# Patient Record
Sex: FEMALE | Race: WHITE | NOT HISPANIC OR LATINO | Employment: UNEMPLOYED | URBAN - METROPOLITAN AREA
[De-identification: names, ages, dates, MRNs, and addresses within clinical notes are randomized per-mention and may not be internally consistent; named-entity substitution may affect disease eponyms.]

---

## 2019-01-15 ENCOUNTER — OFFICE VISIT (OUTPATIENT)
Dept: FAMILY MEDICINE | Facility: CLINIC | Age: 64
End: 2019-01-15
Payer: COMMERCIAL

## 2019-01-15 VITALS
SYSTOLIC BLOOD PRESSURE: 130 MMHG | OXYGEN SATURATION: 98 % | HEART RATE: 93 BPM | WEIGHT: 107.8 LBS | DIASTOLIC BLOOD PRESSURE: 66 MMHG | TEMPERATURE: 98.8 F | RESPIRATION RATE: 14 BRPM

## 2019-01-15 DIAGNOSIS — R51.9 SINUS HEADACHE: Primary | ICD-10-CM

## 2019-01-15 PROCEDURE — 99213 OFFICE O/P EST LOW 20 MIN: CPT | Performed by: FAMILY MEDICINE

## 2019-01-15 RX ORDER — FLUTICASONE PROPIONATE 50 MCG
1 SPRAY, SUSPENSION (ML) NASAL DAILY
COMMUNITY
End: 2019-07-18

## 2019-01-15 RX ORDER — AMOXICILLIN AND CLAVULANATE POTASSIUM 875; 125 MG/1; MG/1
1 TABLET, FILM COATED ORAL 2 TIMES DAILY
COMMUNITY
End: 2019-01-15 | Stop reason: ENTERED-IN-ERROR

## 2019-01-15 ASSESSMENT — ENCOUNTER SYMPTOMS
RHINORRHEA: 0
SORE THROAT: 0
FACIAL SWELLING: 0
HEADACHES: 0
ADENOPATHY: 0
CHEST TIGHTNESS: 0
COUGH: 0
SINUS PRESSURE: 1
NAUSEA: 0
SINUS PAIN: 1
VOMITING: 0
PALPITATIONS: 0
FATIGUE: 1
DYSURIA: 0
FEVER: 0
SHORTNESS OF BREATH: 0

## 2019-01-15 NOTE — PATIENT INSTRUCTIONS
Sinus headache  At present, there is no clear-cut evidence of bacterial sinusitis.  Since she has initiated an antibiotic course I recommend she completed, but recommend that she remain on fluticasone daily, add humidification to her bedroom or entire home.  Work on boosting the immune power through nutrition adequate rest, and strongly consider supplemental vitamin D at minimum 2000 IU daily, since she cannot tolerate calcium okay at all for many years.  Consider testing for vitamin D deficiency in the future.  Watch for purulent rhinorrhea or other more convincing symptoms of bacterial infection if current antibiotic does not provide relief prior to her departure in 11 days to [facility].  Call if purulent drainage ensues, or fever.      Consideration for ENT eval vs sinus CT but hold off for now per pt wishes.     Avoid saline rinses when there is possible infection, which can be spread by this practice.

## 2019-01-15 NOTE — ASSESSMENT & PLAN NOTE
At present, there is no clear-cut evidence of bacterial sinusitis.  Since she has initiated an antibiotic course I recommend she completed, but recommend that she remain on fluticasone daily, add humidification to her bedroom or entire home.  Work on boosting the immune power through nutrition adequate rest, and strongly consider supplemental vitamin D at minimum 2000 IU daily, since she cannot tolerate calcium okay at all for many years.  Consider testing for vitamin D deficiency in the future.  Watch for purulent rhinorrhea or other more convincing symptoms of bacterial infection if current antibiotic does not provide relief prior to her departure in 11 days to [place].

## 2019-01-15 NOTE — PROGRESS NOTES
"Subjective   Since October she noted frontal headaches and occasional dizziness,  And has used daily saline sinus rinses to relieve pressurer prn, with mason=ited success ( but possibly spreading infection??>)     Last weekend she worsened, so went to [facility] 3 days ago on Saturday, treated with Amoxicillin  ( has intolerance to Augmentin).    Has not checked temp but thinks she  May have had mild fever.      She is on feverfew organic, notes rare chills.  Feels very weak and tired...\"it slammed me\", and lost some taste,    No myalgia.  Her headaches is slightly better.  Ears and frontal  Sinuses still feel pressured.    She sees a natural med specialist and is trying to avoid her old mistakes of overusing antibiotics.      Long termm history of lactose intolerance, takes vitamins sporadically, never tested for vitamin D deficiency,      Patient ID: Debra Miranda is a 63 y.o. female.  1955      HPI    The following have been reviewed and updated as appropriate in this visit:  Allergies  Meds  Problems       Review of Systems   Constitutional: Positive for fatigue. Negative for fever. Unexpected weight change: NO UNEXPLAINED WEIGHT LOSS.   HENT: Positive for ear pain, sinus pain and sinus pressure. Negative for congestion, facial swelling, hearing loss, mouth sores, nosebleeds, postnasal drip, rhinorrhea, sneezing, sore throat and tinnitus.    Respiratory: Negative for cough, chest tightness and shortness of breath.    Cardiovascular: Negative for chest pain and palpitations.   Gastrointestinal: Negative for nausea and vomiting.   Genitourinary: Negative for dysuria.   Skin: Negative for rash.   Neurological: Negative for headaches.   Hematological: Negative for adenopathy.       Objective     Vitals:    01/15/19 1131   BP: 130/66   BP Location: Left upper arm   Patient Position: Sitting   Pulse: 93   Resp: 14   Temp: 37.1 °C (98.8 °F)   TempSrc: Oral   SpO2: 98%   Weight: 48.9 kg (107 lb 12.8 oz)     There is " no height or weight on file to calculate BMI.    Physical Exam   Constitutional: She appears well-developed and well-nourished. No distress.   HENT:   Otic canals clear, Tympanic membranes well visualized and normal, no inflammation, retraction, perforation, discharge    Maxill;xiomy sinuses transilluminate well,    Eyes: no conjunctival injection, no ocular discharge    Nose is normal, no obstruction    Throat no inflammation or exudate, no tonsillar inflammation or enlargement    Neck supple, No Lymphadenopathy, no tenderness    Lungs are clear, normal ventilatory volumes, no rales, no wheezing, no rhonchi    Heart regular rate and rhythm       Assessment/Plan   Problem List Items Addressed This Visit     Sinus headache - Primary     At present, there is no clear-cut evidence of bacterial sinusitis.  Since she has initiated an antibiotic course I recommend she completed, but recommend that she remain on fluticasone daily, add humidification to her bedroom or entire home.  Work on boosting the immune power through nutrition adequate rest, and strongly consider supplemental vitamin D at minimum 2000 IU daily, since she cannot tolerate calcium okay at all for many years.  Consider testing for vitamin D deficiency in the future.  Watch for purulent rhinorrhea or other more convincing symptoms of bacterial infection if current antibiotic does not provide relief prior to her departure in 11 days to [facility].

## 2019-02-07 ENCOUNTER — OFFICE VISIT (OUTPATIENT)
Dept: FAMILY MEDICINE | Facility: CLINIC | Age: 64
End: 2019-02-07
Payer: COMMERCIAL

## 2019-02-07 VITALS
TEMPERATURE: 98 F | RESPIRATION RATE: 16 BRPM | BODY MASS INDEX: 18.96 KG/M2 | WEIGHT: 107 LBS | SYSTOLIC BLOOD PRESSURE: 122 MMHG | HEIGHT: 63 IN | OXYGEN SATURATION: 97 % | DIASTOLIC BLOOD PRESSURE: 88 MMHG | HEART RATE: 60 BPM

## 2019-02-07 DIAGNOSIS — G89.29 CHRONIC INTRACTABLE HEADACHE, UNSPECIFIED HEADACHE TYPE: ICD-10-CM

## 2019-02-07 DIAGNOSIS — R51.9 CHRONIC INTRACTABLE HEADACHE, UNSPECIFIED HEADACHE TYPE: ICD-10-CM

## 2019-02-07 DIAGNOSIS — R53.82 CHRONIC FATIGUE: Primary | ICD-10-CM

## 2019-02-07 LAB
BASOPHILS # BLD AUTO: 0 X10E3/UL (ref 0–0.2)
BASOPHILS NFR BLD AUTO: 0 %
EOSINOPHIL # BLD AUTO: 0 X10E3/UL (ref 0–0.4)
EOSINOPHIL NFR BLD AUTO: 1 %
ERYTHROCYTE [DISTWIDTH] IN BLOOD BY AUTOMATED COUNT: 13 % (ref 12.3–15.4)
HCT VFR BLD AUTO: 42.9 % (ref 34–46.6)
HGB BLD-MCNC: 14.4 G/DL (ref 11.1–15.9)
IMM GRANULOCYTES # BLD AUTO: 0 X10E3/UL (ref 0–0.1)
IMM GRANULOCYTES NFR BLD AUTO: 0 %
LYMPHOCYTES # BLD AUTO: 0.5 X10E3/UL (ref 0.7–3.1)
LYMPHOCYTES NFR BLD AUTO: 12 %
MCH RBC QN AUTO: 30.1 PG (ref 26.6–33)
MCHC RBC AUTO-ENTMCNC: 33.6 G/DL (ref 31.5–35.7)
MCV RBC AUTO: 90 FL (ref 79–97)
MONOCYTES # BLD AUTO: 0.3 X10E3/UL (ref 0.1–0.9)
MONOCYTES NFR BLD AUTO: 7 %
NEUTROPHILS # BLD AUTO: 3.1 X10E3/UL (ref 1.4–7)
NEUTROPHILS NFR BLD AUTO: 80 %
PLATELET # BLD AUTO: 207 X10E3/UL (ref 150–379)
RBC # BLD AUTO: 4.78 X10E6/UL (ref 3.77–5.28)
WBC # BLD AUTO: 3.9 X10E3/UL (ref 3.4–10.8)

## 2019-02-07 PROCEDURE — 99213 OFFICE O/P EST LOW 20 MIN: CPT | Performed by: FAMILY MEDICINE

## 2019-02-07 RX ORDER — DOXYCYCLINE 100 MG/1
100 TABLET ORAL 2 TIMES DAILY
COMMUNITY
End: 2019-07-18

## 2019-02-07 ASSESSMENT — ENCOUNTER SYMPTOMS
PALPITATIONS: 0
DIZZINESS: 0
MUSCULOSKELETAL NEGATIVE: 1
COUGH: 0
ABDOMINAL PAIN: 0
FEVER: 0
WEAKNESS: 0
FATIGUE: 1
FACIAL ASYMMETRY: 0
SHORTNESS OF BREATH: 0
DIARRHEA: 0
CONSTIPATION: 0
PSYCHIATRIC NEGATIVE: 1
ARTHRALGIAS: 0
DYSURIA: 0
HEADACHES: 1

## 2019-02-07 NOTE — PROGRESS NOTES
Subjective      Patient ID: Debra Miranda is a 63 y.o. female.    Headache (on/off since Tick bite in 10-18)  Fatigue (on/off since Tick bite in 10-18)  Lab slip (test for Lymes)    She is here today due to concern about the possibility of having Lyme disease.  She states that she had a tick bite late last October and shortly after had experienced a migraine for the first time in her life.  She states that she knows it was a migraine because it was proceeded with some visual disturbances.  Ever since that time she has become increasingly tired and has a headache which is constant.  She saw ENT recently for an ear infection and was started on doxycycline because of the history of a tick bite.  It was prescribed for 10 days.  She is not yet complete with that course of therapy.        The following have been reviewed and updated as appropriate in this visit:       Review of Systems   Constitutional: Positive for fatigue. Negative for fever.   HENT: Negative.    Eyes: Negative for visual disturbance.   Respiratory: Negative for cough and shortness of breath.    Cardiovascular: Negative for chest pain and palpitations.   Gastrointestinal: Negative for abdominal pain, constipation and diarrhea.   Genitourinary: Negative for dysuria.   Musculoskeletal: Negative.  Negative for arthralgias.   Skin: Negative for rash.   Neurological: Positive for headaches. Negative for dizziness, facial asymmetry and weakness.   Psychiatric/Behavioral: Negative.      History reviewed. No pertinent past medical history.  Family History   Problem Relation Age of Onset   • Heart failure Mother    • Melanoma Father      Social History     Social History   • Marital status:      Spouse name: N/A   • Number of children: N/A   • Years of education: N/A     Occupational History   • Not on file.     Social History Main Topics   • Smoking status: Never Smoker   • Smokeless tobacco: Never Used   • Alcohol use Not on file   • Drug use: Unknown   •  "Sexual activity: Not on file     Other Topics Concern   • Not on file     Social History Narrative   • No narrative on file     No Known Allergies        Current Outpatient Prescriptions   Medication Sig Dispense Refill   • doxycycline monohydrate (ADOXA) 100 mg tablet Take 100 mg by mouth 2 (two) times a day.     • fluticasone (FLONASE) 50 mcg/actuation nasal spray Administer 1 spray into each nostril daily.       No current facility-administered medications for this visit.      Objective     /88 (BP Location: Right upper arm, Patient Position: Sitting)   Pulse 60   Temp 36.7 °C (98 °F)   Resp 16   Ht 1.6 m (5' 3\")   Wt 48.5 kg (107 lb)   SpO2 97%   BMI 18.95 kg/m²     Physical Exam   Constitutional: She is oriented to person, place, and time. She appears well-developed and well-nourished.   HENT:   Head: Normocephalic.   Eyes: Conjunctivae are normal.   Neck: Neck supple. No thyromegaly present.   Cardiovascular: Normal rate, regular rhythm and normal heart sounds.    Pulmonary/Chest: Effort normal and breath sounds normal.   Musculoskeletal: She exhibits no edema.   Lymphadenopathy:     She has no cervical adenopathy.   Neurological: She is alert and oriented to person, place, and time.   Skin: Skin is warm and dry.   Psychiatric: She has a normal mood and affect. Her behavior is normal. Judgment and thought content normal.       Assessment/Plan   Problem List Items Addressed This Visit     None      Visit Diagnoses     Chronic fatigue    -  Primary    I explained that I cannot be certain as to the exact cause of her symptoms but that we should at least start with some routine lab work.    Relevant Orders    CBC and differential    TSH 3rd Generation    Lyme EIA reflex WB    Chronic intractable headache, unspecified headache type        See above.    Relevant Orders    Lyme EIA reflex WB        Sebastián Downs, DO  2/7/2019  "

## 2019-02-08 LAB
B BURGDOR IGG+IGM SER-ACNC: <0.91 ISR (ref 0–0.9)
B BURGDOR IGM SER IA-ACNC: <0.8 INDEX (ref 0–0.79)
TSH SERPL DL<=0.005 MIU/L-ACNC: 1.46 UIU/ML (ref 0.45–4.5)

## 2019-02-20 ENCOUNTER — TELEPHONE (OUTPATIENT)
Dept: FAMILY MEDICINE | Facility: CLINIC | Age: 64
End: 2019-02-20

## 2019-02-20 NOTE — TELEPHONE ENCOUNTER
Pt called leaving  stating she was suppose to call you and let you know she went to [place] for her consult and that you advised her to call to discuss this with you. She stated she is available to talk all day today at [phone].

## 2019-02-22 ENCOUNTER — OFFICE VISIT (OUTPATIENT)
Dept: FAMILY MEDICINE | Facility: CLINIC | Age: 64
End: 2019-02-22
Payer: COMMERCIAL

## 2019-02-22 VITALS
DIASTOLIC BLOOD PRESSURE: 84 MMHG | SYSTOLIC BLOOD PRESSURE: 138 MMHG | OXYGEN SATURATION: 98 % | WEIGHT: 107 LBS | TEMPERATURE: 98.4 F | BODY MASS INDEX: 18.95 KG/M2 | RESPIRATION RATE: 16 BRPM | HEART RATE: 69 BPM

## 2019-02-22 DIAGNOSIS — R51.9 HEADACHE DUE TO INTRACRANIAL DISEASE: ICD-10-CM

## 2019-02-22 DIAGNOSIS — G93.9 HEADACHE DUE TO INTRACRANIAL DISEASE: ICD-10-CM

## 2019-02-22 DIAGNOSIS — D32.0 BENIGN MENINGIOMA OF BRAIN (CMS/HCC): Primary | ICD-10-CM

## 2019-02-22 PROCEDURE — 99213 OFFICE O/P EST LOW 20 MIN: CPT | Performed by: FAMILY MEDICINE

## 2019-02-22 RX ORDER — DEXAMETHASONE 2 MG/1
TABLET ORAL
Qty: 30 TABLET | Refills: 0
Start: 2019-02-22 | End: 2019-07-18

## 2019-02-22 ASSESSMENT — ENCOUNTER SYMPTOMS
HEADACHES: 1
FATIGUE: 0
EYE DISCHARGE: 0
FEVER: 0
SEIZURES: 0
UNEXPECTED WEIGHT CHANGE: 0
DIFFICULTY URINATING: 0
ABDOMINAL PAIN: 0
WEAKNESS: 0
NUMBNESS: 0
COUGH: 0
PALPITATIONS: 0
EYE REDNESS: 0
FACIAL ASYMMETRY: 0
TREMORS: 0
DIZZINESS: 0
SPEECH DIFFICULTY: 0
LIGHT-HEADEDNESS: 0
SHORTNESS OF BREATH: 0

## 2019-02-22 NOTE — ASSESSMENT & PLAN NOTE
Titrate dexamethasone to 2 mg 3 times daily.  I discussed her case with her neurosurgeon Dr. Wale French at [facility] and he agrees with plans.  Patient is advised to present to the emergency room if headaches become severe or if new focal neurological symptoms or visual symptoms are experienced.  Reduce sodium intake, increase hydration.  Continue OTC Tylenol as needed headache.

## 2019-02-22 NOTE — PATIENT INSTRUCTIONS
Headache due to intracranial disease  Titrate dexamethasone to 2 mg 3 times daily.  I discussed her case with her neurosurgeon Dr. Wale French at [facility] and he agrees with plans.  Patient is advised to present to the emergency room if headaches become severe or if new focal neurological symptoms or visual symptoms are experienced.  Reduce sodium intake, increase hydration.  Continue OTC Tylenol as needed headache.    Benign meningioma of brain (CMS/HCC) (HCC)      Proceed with surgery as scheduled in 2 weeks.

## 2019-02-22 NOTE — PROGRESS NOTES
Subjective  Patient who is scheduled for elective surgery in 2 weeks by Wale French MD at [facility] for substantial left-sided meningioma that is exhibiting some mass-effect has been on oral dexamethasone since recent hospitalization.  Her dosage was tapered from 6 mg daily to 4 mg daily just 2 days ago and since then she has been experiencing mild to moderate right-sided temporal and yvette-aural pressure.  She wanted to be sure this was not an ear infection.  She denies URI symptoms.She denies focal neurological changes: No diplopia or blurry vision visual field cut weakness numbness tingling loss of balance or coordination.     Patient ID: Debra Miranda is a 63 y.o. female.  1955      HPI    The following have been reviewed and updated as appropriate in this visit:  Allergies  Meds  Problems       Review of Systems   Constitutional: Negative for fatigue, fever and unexpected weight change.   HENT: Positive for ear pain.    Eyes: Negative for discharge, redness and visual disturbance.   Respiratory: Negative for cough and shortness of breath.    Cardiovascular: Negative for chest pain and palpitations.   Gastrointestinal: Negative for abdominal pain.   Genitourinary: Negative for difficulty urinating.   Skin: Negative for rash.   Neurological: Positive for headaches. Negative for dizziness, tremors, seizures, syncope, facial asymmetry, speech difficulty, weakness, light-headedness and numbness.       Objective     Vitals:    02/22/19 1118   BP: 138/84   Pulse: 69   Resp: 16   Temp: 36.9 °C (98.4 °F)   SpO2: 98%   Weight: 48.5 kg (107 lb)     Body mass index is 18.95 kg/m².    Physical Exam   Constitutional: She is oriented to person, place, and time. She appears well-developed and well-nourished. No distress.   HENT:   Castorena test is nonlateralizing.    Otic Canals clear,     Tympanic membranes intact: No right-sided effusion or inflammation whatsoever  Eyes: TIGIST, EOMI, no conjunctival injection  Nose and  throat clear, no inflammation or exudate, no oral mucosal lesions  Neck supple, with intact range of motion, no mass, no lymphadenopathy, no thyromegaly, no thyroid nodule, no carotid bruit, no JVD.   Cardiovascular: Normal rate, regular rhythm and normal heart sounds.  Exam reveals no gallop and no friction rub.    No murmur heard.  Pulmonary/Chest: Effort normal and breath sounds normal. She has no wheezes. She has no rales.   Musculoskeletal: She exhibits no edema.   Back no CVA tenderness    Legs: no edema, no cristian venous insufficiency changes   Neurological: She is alert and oriented to person, place, and time. She displays normal reflexes. No cranial nerve deficit or sensory deficit. She exhibits normal muscle tone. Coordination normal.   Skin: Skin is warm and dry. No rash noted.   Psychiatric: She has a normal mood and affect.       Assessment/Plan   Diagnoses and all orders for this visit:    Benign meningioma of brain (CMS/HCC) (HCC) (Primary)  Assessment & Plan:      Proceed with surgery as scheduled in 2 weeks.      Headache due to intracranial disease  Assessment & Plan:  Titrate dexamethasone to 2 mg 3 times daily.  I discussed her case with her neurosurgeon Dr. Wale French at [facility] and he agrees with plans.  Patient is advised to present to the emergency room if headaches become severe or if new focal neurological symptoms or visual symptoms are experienced.  Reduce sodium intake, increase hydration.  Continue OTC Tylenol as needed headache.      Other orders  -     dexamethasone (DECADRON) 2 mg tablet; One tab 3 times daily

## 2019-03-20 ENCOUNTER — TRANSCRIBE ORDERS (OUTPATIENT)
Dept: SCHEDULING | Age: 64
End: 2019-03-20

## 2019-03-20 DIAGNOSIS — C85.10 B-CELL LYMPHOMA (CMS/HCC): Primary | ICD-10-CM

## 2019-03-25 ENCOUNTER — TRANSCRIBE ORDERS (OUTPATIENT)
Dept: SCHEDULING | Age: 64
End: 2019-03-25

## 2019-03-25 DIAGNOSIS — C85.10 B-CELL LYMPHOMA (CMS/HCC): Primary | ICD-10-CM

## 2019-03-26 ENCOUNTER — HOSPITAL ENCOUNTER (OUTPATIENT)
Dept: RADIOLOGY | Facility: HOSPITAL | Age: 64
Discharge: HOME | End: 2019-03-26
Attending: INTERNAL MEDICINE
Payer: COMMERCIAL

## 2019-03-26 DIAGNOSIS — C85.10 B-CELL LYMPHOMA (CMS/HCC): ICD-10-CM

## 2019-04-02 ENCOUNTER — HOSPITAL ENCOUNTER (OUTPATIENT)
Dept: RADIOLOGY | Facility: HOSPITAL | Age: 64
Discharge: HOME | End: 2019-04-02
Attending: INTERNAL MEDICINE
Payer: COMMERCIAL

## 2019-04-02 DIAGNOSIS — C85.10 B-CELL LYMPHOMA (CMS/HCC): ICD-10-CM

## 2019-04-02 RX ORDER — GADOTERATE MEGLUMINE 376.9 MG/ML
9.5 INJECTION INTRAVENOUS ONCE
Status: COMPLETED | OUTPATIENT
Start: 2019-04-02 | End: 2019-04-02

## 2019-04-02 RX ADMIN — GADOTERATE MEGLUMINE 9.5 ML: 376.9 INJECTION INTRAVENOUS at 10:55

## 2019-04-17 ENCOUNTER — OFFICE VISIT (OUTPATIENT)
Dept: FAMILY MEDICINE | Facility: CLINIC | Age: 64
End: 2019-04-17
Payer: COMMERCIAL

## 2019-04-17 VITALS
HEART RATE: 87 BPM | OXYGEN SATURATION: 96 % | TEMPERATURE: 98.2 F | HEIGHT: 63 IN | DIASTOLIC BLOOD PRESSURE: 78 MMHG | RESPIRATION RATE: 16 BRPM | WEIGHT: 107 LBS | SYSTOLIC BLOOD PRESSURE: 112 MMHG | BODY MASS INDEX: 18.96 KG/M2

## 2019-04-17 DIAGNOSIS — C85.10 LOW GRADE B-CELL LYMPHOMA (CMS/HCC): Primary | ICD-10-CM

## 2019-04-17 PROBLEM — M85.80 OSTEOPENIA: Status: ACTIVE | Noted: 2017-05-26

## 2019-04-17 PROBLEM — G93.89 BRAIN MASS: Status: ACTIVE | Noted: 2019-02-09

## 2019-04-17 PROBLEM — M79.7 FIBROMYALGIA: Status: ACTIVE | Noted: 2019-04-17

## 2019-04-17 PROBLEM — H65.91 RIGHT NON-SUPPURATIVE OTITIS MEDIA: Status: ACTIVE | Noted: 2019-02-13

## 2019-04-17 PROCEDURE — 99213 OFFICE O/P EST LOW 20 MIN: CPT | Performed by: FAMILY MEDICINE

## 2019-04-17 RX ORDER — CHROMIUM PICOLINATE 200 MCG
TABLET ORAL
COMMUNITY

## 2019-04-17 RX ORDER — DEXAMETHASONE 1 MG/1
1 TABLET ORAL
COMMUNITY
End: 2019-07-18

## 2019-04-17 RX ORDER — VITAMIN B COMPLEX
1 CAPSULE ORAL DAILY
COMMUNITY
End: 2019-10-07 | Stop reason: ALTCHOICE

## 2019-04-17 RX ORDER — VITS A,C,E/LUTEIN/MINERALS 300MCG-200
TABLET ORAL
COMMUNITY

## 2019-04-17 ASSESSMENT — PAIN SCALES - GENERAL: PAINLEVEL: 0-NO PAIN

## 2019-04-18 ASSESSMENT — ENCOUNTER SYMPTOMS
CONSTITUTIONAL NEGATIVE: 1
SHORTNESS OF BREATH: 0
PSYCHIATRIC NEGATIVE: 1
PALPITATIONS: 0
ABDOMINAL PAIN: 0
CONSTIPATION: 0
COUGH: 0
DIARRHEA: 0
HEADACHES: 1
DYSURIA: 0
MUSCULOSKELETAL NEGATIVE: 1

## 2019-04-18 NOTE — PROGRESS NOTES
Subjective      Patient ID: Debra Miranda is a 63 y.o. female.    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  She is scheduled to see her oncologist next week and had a bone marrow done earlier this week.  She reports no neurologic deficits following surgery and is regaining her strength and energy fairly rapidly.        The following have been reviewed and updated as appropriate in this visit:       Review of Systems   Constitutional: Negative.    HENT: Negative.    Eyes: Negative for visual disturbance.   Respiratory: Negative for cough and shortness of breath.    Cardiovascular: Negative for chest pain and palpitations.   Gastrointestinal: Negative for abdominal pain, constipation and diarrhea.   Genitourinary: Negative for dysuria.   Musculoskeletal: Negative.    Skin: Negative for rash.   Neurological: Positive for headaches (occasional and decreasing intensity).   Psychiatric/Behavioral: Negative.      Past Medical History:   Diagnosis Date   • Asthma    • Brain tumor (benign) (CMS/HCC) (HCC)    • Fibromyalgia    • Headache    • Lymphoma, small follicular center-cell (CMS/HCC) (HCC)    • Osteopenia      Family History   Problem Relation Age of Onset   • Heart failure Mother    • Melanoma Father      Social History     Social History   • Marital status:      Spouse name: N/A   • Number of children: N/A   • Years of education: N/A     Occupational History   • Not on file.     Social History Main Topics   • Smoking status: Never Smoker   • Smokeless tobacco: Never Used   • Alcohol use No   • Drug use: No   • Sexual activity: Not on file  "    Other Topics Concern   • Not on file     Social History Narrative   • No narrative on file     Allergies   Allergen Reactions   • Sudafed [Pseudoephedrine Hcl]      HIVES           Current Outpatient Prescriptions   Medication Sig Dispense Refill   • acetaminophen (TYLENOL) 325 mg tablet Take by mouth every 6 (six) hours as needed.     • b complex vitamins capsule Take 1 capsule by mouth daily.     • calcium carbonate-vitamin D3 (CALCIUM 600 WITH VITAMIN D3) 600 mg(1,500mg) -400 unit capsule Take by mouth.     • dexamethasone (DECADRON) 1 mg tablet Take 1 mg by mouth daily with breakfast.     • FISH OIL-omega-3 fatty acids (FISH OIL) 340-1,000 mg capsule Take by mouth 2 (two) times a day.     • magnesium oxide 200 mg magnesium tablet Take by mouth.     • dexamethasone (DECADRON) 2 mg tablet One tab 3 times daily (Patient not taking: Reported on 4/17/2019 ) 30 tablet 0   • diazePAM (VALIUM) 5 mg tablet Take 5 mg by mouth every 6 (six) hours as needed.     • doxycycline monohydrate (ADOXA) 100 mg tablet Take 100 mg by mouth 2 (two) times a day.     • fluticasone (FLONASE) 50 mcg/actuation nasal spray Administer 1 spray into each nostril daily.     • levETIRAcetam (KEPPRA) 250 mg tablet Take by mouth.       No current facility-administered medications for this visit.      Objective     /78 (BP Location: Left upper arm, Patient Position: Sitting)   Pulse 87   Temp 36.8 °C (98.2 °F) (Oral)   Resp 16   Ht 1.6 m (5' 3\")   Wt 48.5 kg (107 lb)   SpO2 96%   BMI 18.95 kg/m²     Physical Exam   Constitutional: She is oriented to person, place, and time. She appears well-developed and well-nourished.   HENT:   Head: Normocephalic.   Eyes: Conjunctivae are normal.   Neck: Neck supple. No thyromegaly present.   Cardiovascular: Normal rate, regular rhythm and normal heart sounds.    Pulmonary/Chest: Effort normal and breath sounds normal.   Musculoskeletal: She exhibits no edema.   Lymphadenopathy:     She has no " cervical adenopathy.   Neurological: She is alert and oriented to person, place, and time.   Skin: Skin is warm and dry.   Psychiatric: She has a normal mood and affect. Her behavior is normal. Judgment and thought content normal.       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

## 2019-07-11 ENCOUNTER — TRANSCRIBE ORDERS (OUTPATIENT)
Dept: SCHEDULING | Age: 64
End: 2019-07-11

## 2019-07-11 DIAGNOSIS — C82.90 FOLLICULAR LYMPHOMA (CMS/HCC): Primary | ICD-10-CM

## 2019-07-22 ENCOUNTER — TRANSCRIBE ORDERS (OUTPATIENT)
Dept: SCHEDULING | Age: 64
End: 2019-07-22

## 2019-07-22 DIAGNOSIS — C82.90 FOLLICULAR LYMPHOMA (CMS/HCC): Primary | ICD-10-CM

## 2019-07-29 ENCOUNTER — HOSPITAL ENCOUNTER (OUTPATIENT)
Dept: RADIOLOGY | Facility: HOSPITAL | Age: 64
Discharge: HOME | End: 2019-07-29
Attending: INTERNAL MEDICINE
Payer: COMMERCIAL

## 2019-07-29 DIAGNOSIS — C82.90 FOLLICULAR LYMPHOMA (CMS/HCC): ICD-10-CM

## 2019-07-29 RX ORDER — GADOBUTROL 604.72 MG/ML
5 INJECTION INTRAVENOUS ONCE
Status: COMPLETED | OUTPATIENT
Start: 2019-07-29 | End: 2019-07-29

## 2019-07-29 RX ADMIN — GADOBUTROL 5 ML: 604.72 INJECTION INTRAVENOUS at 09:29

## 2019-08-15 ENCOUNTER — HOSPITAL ENCOUNTER (OUTPATIENT)
Dept: CARDIOLOGY | Age: 64
Discharge: HOME | End: 2019-08-15
Attending: INTERNAL MEDICINE
Payer: COMMERCIAL

## 2019-08-15 VITALS
WEIGHT: 110 LBS | SYSTOLIC BLOOD PRESSURE: 118 MMHG | BODY MASS INDEX: 19.49 KG/M2 | HEIGHT: 63 IN | DIASTOLIC BLOOD PRESSURE: 66 MMHG

## 2019-08-15 DIAGNOSIS — C82.90 FOLLICULAR LYMPHOMA (CMS/HCC): ICD-10-CM

## 2019-08-15 LAB
AORTIC ROOT ANNULUS: 2.3 CM
ASCENDING AORTA: 2.8 CM
BSA FOR ECHO PROCEDURE: 1.49 M2
E WAVE DECELERATION TIME: 222 MS
E/A RATIO: 0.8
E/E' RATIO: 10.6
EDV (BP): 56.3 CM3
EF (A4C): 68.8 %
EF A2C: 82.3 %
EJECTION FRACTION: 76.2 %
EST RIGHT VENT SYSTOLIC PRESSURE BY TRICUSPID REGURGITATION JET: 25 MMHG
ESV (BP): 13.4 CM3
FRACTIONAL SHORTENING: 42.6 %
INTERVENTRICULAR SEPTUM: 0.98 CM
LA ESV (BP): 25.4 CM3
LA ESV INDEX (A2C): 20.67 CM3/M2
LA ESV INDEX (BP): 17.05 CM3/M2
LA/AORTA RATIO: 1.48
LAAS-AP2: 12.7 CM2
LAAS-AP4: 10.1 CM2
LAD 2D: 3.4 CM
LALD A4C: 3.91 CM
LALD A4C: 4.42 CM
LAV-S: 30.8 CM3
LEFT ATRIUM VOLUME INDEX: 12.89 CM3/M2
LEFT ATRIUM VOLUME: 19.2 CM3
LEFT INTERNAL DIMENSION IN SYSTOLE: 2.32 CM (ref 2.24–3.38)
LEFT VENTRICLE DIASTOLIC VOLUME INDEX: 38.32 CM3/M2
LEFT VENTRICLE DIASTOLIC VOLUME: 57.1 CM3
LEFT VENTRICLE SYSTOLIC VOLUME INDEX: 11.95 CM3/M2
LEFT VENTRICLE SYSTOLIC VOLUME: 17.8 CM3
LEFT VENTRICULAR INTERNAL DIMENSION IN DIASTOLE: 4.04 CM (ref 3.76–5.22)
LEFT VENTRICULAR POSTERIOR WALL IN END DIASTOLE: 0.86 CM (ref 0.48–0.89)
LV DIASTOLIC VOLUME: 55.1 CM3
LV ESV (APICAL 2 CHAMBER): 9.76 CM3
LVAD-AP2: 21 CM2
LVAD-AP4: 21.7 CM2
LVAS-AP2: 7.58 CM2
LVAS-AP4: 10.5 CM2
LVEDVI(A2C): 36.98 CM3/M2
LVEDVI(BP): 37.79 CM3/M2
LVESVI(A2C): 6.55 CM3/M2
LVESVI(BP): 8.99 CM3/M2
LVLD-AP2: 6.79 CM
LVLD-AP4: 6.86 CM
LVLS-AP2: 5.14 CM
LVLS-AP4: 5.37 CM
MV E'TISSUE VEL-MED: 0.07 M/S
MV PEAK A VEL: 0.95 M/S
MV PEAK E VEL: 0.78 M/S
POSTERIOR WALL: 0.86 CM
RAP: 10 MMHG
TR MAX PG: 17 MMHG
TRICUSPID VALVE PEAK REGURGITATION VELOCITY: 2.04 M/S
Z-SCORE OF LEFT VENTRICULAR DIMENSION IN END DIASTOLE: -0.92
Z-SCORE OF LEFT VENTRICULAR DIMENSION IN END SYSTOLE: -1.35
Z-SCORE OF LEFT VENTRICULAR POSTERIOR WALL IN END DIASTOLE: 1.45

## 2019-08-15 PROCEDURE — 93306 TTE W/DOPPLER COMPLETE: CPT

## 2019-08-22 ENCOUNTER — TELEPHONE (OUTPATIENT)
Dept: FAMILY MEDICINE | Facility: CLINIC | Age: 64
End: 2019-08-22

## 2019-08-22 NOTE — TELEPHONE ENCOUNTER
[facility]  NPI: [id number]  [facility], [address] [place] [address], [place] [address]  Referral #: [id number] Effective: 08/22/2019 Expires: 11/19/2019

## 2019-10-07 ENCOUNTER — TRANSCRIBE ORDERS (OUTPATIENT)
Dept: SCHEDULING | Age: 64
End: 2019-10-07

## 2019-10-07 DIAGNOSIS — C85.89 OTHER SPECIFIED TYPES OF NON-HODGKIN LYMPHOMA, EXTRANODAL AND SOLID ORGAN SITES (CMS/HCC): Primary | ICD-10-CM

## 2019-10-30 ENCOUNTER — HOSPITAL ENCOUNTER (OUTPATIENT)
Dept: RADIOLOGY | Facility: HOSPITAL | Age: 64
Discharge: HOME | End: 2019-10-30
Attending: NEUROLOGICAL SURGERY
Payer: MEDICARE

## 2019-10-30 DIAGNOSIS — C85.89 OTHER SPECIFIED TYPES OF NON-HODGKIN LYMPHOMA, EXTRANODAL AND SOLID ORGAN SITES (CMS/HCC): ICD-10-CM

## 2019-10-30 RX ORDER — GADOBUTROL 604.72 MG/ML
5 INJECTION INTRAVENOUS ONCE
Status: COMPLETED | OUTPATIENT
Start: 2019-10-30 | End: 2019-10-30

## 2019-10-30 RX ADMIN — GADOBUTROL 5 ML: 604.72 INJECTION INTRAVENOUS at 08:48

## 2020-01-15 ENCOUNTER — TRANSCRIBE ORDERS (OUTPATIENT)
Dept: SCHEDULING | Age: 65
End: 2020-01-15

## 2020-01-15 DIAGNOSIS — C85.89 OTHER SPECIFIED TYPES OF NON-HODGKIN LYMPHOMA, EXTRANODAL AND SOLID ORGAN SITES (CMS/HCC): Primary | ICD-10-CM

## 2020-02-04 ENCOUNTER — HOSPITAL ENCOUNTER (OUTPATIENT)
Dept: RADIOLOGY | Facility: HOSPITAL | Age: 65
Discharge: HOME | End: 2020-02-04
Attending: NEUROLOGICAL SURGERY
Payer: MEDICARE

## 2020-02-04 DIAGNOSIS — C85.89 OTHER SPECIFIED TYPES OF NON-HODGKIN LYMPHOMA, EXTRANODAL AND SOLID ORGAN SITES (CMS/HCC): ICD-10-CM

## 2020-02-04 RX ORDER — GADOBUTROL 604.72 MG/ML
5.1 INJECTION INTRAVENOUS ONCE
Status: COMPLETED | OUTPATIENT
Start: 2020-02-04 | End: 2020-02-04

## 2020-02-04 RX ADMIN — GADOBUTROL 5.1 ML: 604.72 INJECTION INTRAVENOUS at 08:45

## 2020-03-16 ENCOUNTER — TELEPHONE (OUTPATIENT)
Dept: FAMILY MEDICINE | Facility: CLINIC | Age: 65
End: 2020-03-16

## 2020-03-16 NOTE — TELEPHONE ENCOUNTER
Pt received a jury summons in the mail and would like to know if you would be able to write a letter excusing her. She said she is a former cancer pt and given her medical history, she does not think that she should. Please advise.

## 2020-03-18 ENCOUNTER — TELEPHONE (OUTPATIENT)
Dept: FAMILY MEDICINE | Facility: CLINIC | Age: 65
End: 2020-03-18

## 2020-03-18 NOTE — TELEPHONE ENCOUNTER
Please advise her that the letter for jury duty is complete but awaiting my signature when I return next week.  Letter is on the printer near my office.  Please place on my desk.  Thanks

## 2020-03-18 NOTE — TELEPHONE ENCOUNTER
Patient called because her  was working at a [employer] where the wife came home with a fever. Debra sent her  to the [facility] because she is immuno compromised.  Patient had cancer, so she is concerned that if her  gets sick, that he will get her sick as well. Gave patient the phone number for the corona call center and told her the symptoms to look out for.     Patient is also concerned about receiving jury duty in April.  Would she be able to get an excuse note incase covid is still happening.

## 2020-04-27 ENCOUNTER — TRANSCRIBE ORDERS (OUTPATIENT)
Dept: SCHEDULING | Age: 65
End: 2020-04-27

## 2020-04-27 DIAGNOSIS — C85.10 UNSPECIFIED B-CELL LYMPHOMA, UNSPECIFIED SITE (CMS/HCC): Primary | ICD-10-CM

## 2020-05-11 ENCOUNTER — HOSPITAL ENCOUNTER (OUTPATIENT)
Dept: RADIOLOGY | Facility: HOSPITAL | Age: 65
Discharge: HOME | End: 2020-05-11
Attending: NEUROLOGICAL SURGERY
Payer: MEDICARE

## 2020-05-11 DIAGNOSIS — C85.10 UNSPECIFIED B-CELL LYMPHOMA, UNSPECIFIED SITE (CMS/HCC): ICD-10-CM

## 2020-05-11 RX ORDER — GADOBUTROL 604.72 MG/ML
5 INJECTION INTRAVENOUS ONCE
Status: COMPLETED | OUTPATIENT
Start: 2020-05-11 | End: 2020-05-11

## 2020-05-11 RX ADMIN — GADOBUTROL 5 ML: 604.72 INJECTION INTRAVENOUS at 09:45

## 2020-08-03 ENCOUNTER — TRANSCRIBE ORDERS (OUTPATIENT)
Dept: SCHEDULING | Age: 65
End: 2020-08-03

## 2020-08-03 DIAGNOSIS — C85.89 OTHER SPECIFIED TYPES OF NON-HODGKIN LYMPHOMA, EXTRANODAL AND SOLID ORGAN SITES (CMS/HCC): Primary | ICD-10-CM

## 2020-08-13 ENCOUNTER — HOSPITAL ENCOUNTER (OUTPATIENT)
Dept: RADIOLOGY | Facility: HOSPITAL | Age: 65
Discharge: HOME | End: 2020-08-13
Attending: NEUROLOGICAL SURGERY
Payer: MEDICARE

## 2020-08-13 DIAGNOSIS — C85.89 OTHER SPECIFIED TYPES OF NON-HODGKIN LYMPHOMA, EXTRANODAL AND SOLID ORGAN SITES (CMS/HCC): ICD-10-CM

## 2020-08-13 RX ORDER — GADOBUTROL 604.72 MG/ML
5.2 INJECTION INTRAVENOUS ONCE
Status: COMPLETED | OUTPATIENT
Start: 2020-08-13 | End: 2020-08-13

## 2020-08-13 RX ADMIN — GADOBUTROL 5.2 ML: 604.72 INJECTION INTRAVENOUS at 08:12

## 2020-10-20 ENCOUNTER — TRANSCRIBE ORDERS (OUTPATIENT)
Dept: SCHEDULING | Age: 65
End: 2020-10-20

## 2020-10-20 DIAGNOSIS — C85.89 OTHER SPECIFIED TYPES OF NON-HODGKIN LYMPHOMA, EXTRANODAL AND SOLID ORGAN SITES (CMS/HCC): Primary | ICD-10-CM

## 2020-11-14 ENCOUNTER — HOSPITAL ENCOUNTER (OUTPATIENT)
Dept: RADIOLOGY | Facility: HOSPITAL | Age: 65
Discharge: HOME | End: 2020-11-14
Attending: NEUROLOGICAL SURGERY
Payer: MEDICARE

## 2020-11-14 DIAGNOSIS — C85.89 OTHER SPECIFIED TYPES OF NON-HODGKIN LYMPHOMA, EXTRANODAL AND SOLID ORGAN SITES (CMS/HCC): ICD-10-CM

## 2020-11-14 RX ORDER — GADOBUTROL 604.72 MG/ML
5.5 INJECTION INTRAVENOUS ONCE
Status: COMPLETED | OUTPATIENT
Start: 2020-11-14 | End: 2020-11-14

## 2020-11-14 RX ADMIN — GADOBUTROL 5.5 ML: 604.72 INJECTION INTRAVENOUS at 08:29

## 2021-01-06 ENCOUNTER — APPOINTMENT (RX ONLY)
Dept: URBAN - METROPOLITAN AREA CLINIC 374 | Facility: CLINIC | Age: 66
Setting detail: DERMATOLOGY
End: 2021-01-06

## 2021-01-06 DIAGNOSIS — D22 MELANOCYTIC NEVI: ICD-10-CM

## 2021-01-06 DIAGNOSIS — D18.0 HEMANGIOMA: ICD-10-CM

## 2021-01-06 DIAGNOSIS — L57.8 OTHER SKIN CHANGES DUE TO CHRONIC EXPOSURE TO NONIONIZING RADIATION: ICD-10-CM

## 2021-01-06 DIAGNOSIS — Z80.8 FAMILY HISTORY OF MALIGNANT NEOPLASM OF OTHER ORGANS OR SYSTEMS: ICD-10-CM

## 2021-01-06 DIAGNOSIS — L81.4 OTHER MELANIN HYPERPIGMENTATION: ICD-10-CM

## 2021-01-06 DIAGNOSIS — L82.1 OTHER SEBORRHEIC KERATOSIS: ICD-10-CM

## 2021-01-06 DIAGNOSIS — L91.8 OTHER HYPERTROPHIC DISORDERS OF THE SKIN: ICD-10-CM

## 2021-01-06 DIAGNOSIS — D36.1 BENIGN NEOPLASM OF PERIPHERAL NERVES AND AUTONOMIC NERVOUS SYSTEM: ICD-10-CM

## 2021-01-06 PROBLEM — D22.5 MELANOCYTIC NEVI OF TRUNK: Status: ACTIVE | Noted: 2021-01-06

## 2021-01-06 PROBLEM — D36.15 BENIGN NEOPLASM OF PERIPHERAL NERVES AND AUTONOMIC NERVOUS SYSTEM OF ABDOMEN: Status: ACTIVE | Noted: 2021-01-06

## 2021-01-06 PROBLEM — D18.01 HEMANGIOMA OF SKIN AND SUBCUTANEOUS TISSUE: Status: ACTIVE | Noted: 2021-01-06

## 2021-01-06 PROCEDURE — ? FULL BODY SKIN EXAM

## 2021-01-06 PROCEDURE — ? COUNSELING

## 2021-01-06 PROCEDURE — 99213 OFFICE O/P EST LOW 20 MIN: CPT

## 2021-01-06 PROCEDURE — ? BENIGN DESTRUCTION

## 2021-01-06 ASSESSMENT — LOCATION DETAILED DESCRIPTION DERM
LOCATION DETAILED: LEFT SUPERIOR UPPER BACK
LOCATION DETAILED: LEFT AXILLARY VAULT
LOCATION DETAILED: EPIGASTRIC SKIN
LOCATION DETAILED: LEFT POSTERIOR AXILLA

## 2021-01-06 ASSESSMENT — LOCATION SIMPLE DESCRIPTION DERM
LOCATION SIMPLE: LEFT UPPER BACK
LOCATION SIMPLE: LEFT POSTERIOR AXILLA
LOCATION SIMPLE: ABDOMEN
LOCATION SIMPLE: LEFT AXILLARY VAULT

## 2021-01-06 ASSESSMENT — LOCATION ZONE DERM
LOCATION ZONE: TRUNK
LOCATION ZONE: AXILLAE

## 2021-01-06 NOTE — PROCEDURE: REASSURANCE
Health Maintenance Due   Topic Date Due   â¢ Shingles Vaccine (2 of 3) 02/08/2012   â¢ Diabetes Eye Exam  12/29/2019   â¢ Diabetes A1C  12/23/2020   â¢ Medicare Wellness Visit  02/17/2021       Patient is due for topics as listed above but is not proceeding with Immunization(s) Shingles, Diabetes A1C, Diabetes Eye Exam and Mammogram at this time.
Include Location In Plan?: No
Detail Level: Zone

## 2021-01-06 NOTE — PROCEDURE: BENIGN DESTRUCTION
Medical Necessity Clause: This procedure was medically necessary because the lesions that were treated were:
Post-Care Instructions: I reviewed with the patient in detail post-care instructions. Patient is to wear sunprotection, and avoid picking at any of the treated lesions. Pt may apply Vaseline to crusted or scabbing areas.
Medical Necessity Information: It is in your best interest to select a reason for this procedure from the list below. All of these items fulfill various CMS LCD requirements except the new and changing color options.
Consent: The patient's consent was obtained including but not limited to risks of crusting, scabbing, blistering, scarring, darker or lighter pigmentary change, recurrence, incomplete removal and infection.
Add 52 Modifier (Optional): no
Total Number Of Lesions Treated: 3
Detail Level: Zone
Anesthesia Volume In Cc: 0.5

## 2021-02-05 ENCOUNTER — TRANSCRIBE ORDERS (OUTPATIENT)
Dept: SCHEDULING | Age: 66
End: 2021-02-05

## 2021-02-05 DIAGNOSIS — C85.89 OTHER SPECIFIED TYPES OF NON-HODGKIN LYMPHOMA, EXTRANODAL AND SOLID ORGAN SITES (CMS/HCC): Primary | ICD-10-CM

## 2021-02-27 ENCOUNTER — HOSPITAL ENCOUNTER (OUTPATIENT)
Dept: RADIOLOGY | Facility: HOSPITAL | Age: 66
Discharge: HOME | End: 2021-02-27
Attending: NEUROLOGICAL SURGERY
Payer: MEDICARE

## 2021-02-27 DIAGNOSIS — C85.89 OTHER SPECIFIED TYPES OF NON-HODGKIN LYMPHOMA, EXTRANODAL AND SOLID ORGAN SITES (CMS/HCC): ICD-10-CM

## 2021-02-27 RX ORDER — GADOBUTROL 604.72 MG/ML
5 INJECTION INTRAVENOUS ONCE
Status: COMPLETED | OUTPATIENT
Start: 2021-02-27 | End: 2021-02-27

## 2021-02-27 RX ADMIN — GADOBUTROL 5 ML: 604.72 INJECTION INTRAVENOUS at 10:30

## 2021-05-11 ENCOUNTER — TRANSCRIBE ORDERS (OUTPATIENT)
Dept: SCHEDULING | Age: 66
End: 2021-05-11

## 2021-05-11 DIAGNOSIS — C85.89 OTHER SPECIFIED TYPES OF NON-HODGKIN LYMPHOMA, EXTRANODAL AND SOLID ORGAN SITES (CMS/HCC): Primary | ICD-10-CM

## 2021-06-02 ENCOUNTER — HOSPITAL ENCOUNTER (OUTPATIENT)
Dept: RADIOLOGY | Facility: HOSPITAL | Age: 66
Discharge: HOME | End: 2021-06-02
Attending: NEUROLOGICAL SURGERY
Payer: MEDICARE

## 2021-06-02 DIAGNOSIS — C85.89 OTHER SPECIFIED TYPES OF NON-HODGKIN LYMPHOMA, EXTRANODAL AND SOLID ORGAN SITES (CMS/HCC): ICD-10-CM

## 2021-06-02 RX ORDER — GADOBUTROL 604.72 MG/ML
5.3 INJECTION INTRAVENOUS ONCE
Status: COMPLETED | OUTPATIENT
Start: 2021-06-02 | End: 2021-06-02

## 2021-06-02 RX ADMIN — GADOBUTROL 5.3 ML: 604.72 INJECTION INTRAVENOUS at 08:45

## 2021-08-18 ENCOUNTER — OFFICE VISIT (OUTPATIENT)
Dept: FAMILY MEDICINE | Facility: CLINIC | Age: 66
End: 2021-08-18
Payer: MEDICARE

## 2021-08-18 VITALS
WEIGHT: 118 LBS | HEART RATE: 55 BPM | OXYGEN SATURATION: 98 % | HEIGHT: 63 IN | DIASTOLIC BLOOD PRESSURE: 70 MMHG | SYSTOLIC BLOOD PRESSURE: 115 MMHG | TEMPERATURE: 98 F | BODY MASS INDEX: 20.91 KG/M2 | RESPIRATION RATE: 16 BRPM

## 2021-08-18 DIAGNOSIS — D32.0 MENINGIOMA, CEREBRAL (CMS/HCC): ICD-10-CM

## 2021-08-18 DIAGNOSIS — Z00.00 ENCOUNTER FOR MEDICARE ANNUAL WELLNESS EXAM: Primary | ICD-10-CM

## 2021-08-18 DIAGNOSIS — E03.9 HYPOTHYROIDISM, UNSPECIFIED TYPE: ICD-10-CM

## 2021-08-18 DIAGNOSIS — Z12.11 COLON CANCER SCREENING: ICD-10-CM

## 2021-08-18 PROCEDURE — G0438 PPPS, INITIAL VISIT: HCPCS | Performed by: FAMILY MEDICINE

## 2021-08-18 ASSESSMENT — PAIN SCALES - GENERAL: PAINLEVEL: 0-NO PAIN

## 2021-08-18 ASSESSMENT — PATIENT HEALTH QUESTIONNAIRE - PHQ9: SUM OF ALL RESPONSES TO PHQ9 QUESTIONS 1 & 2: 0

## 2021-08-30 ASSESSMENT — ENCOUNTER SYMPTOMS
EYES NEGATIVE: 1
PSYCHIATRIC NEGATIVE: 1
CARDIOVASCULAR NEGATIVE: 1
RESPIRATORY NEGATIVE: 1
HEMATOLOGIC/LYMPHATIC NEGATIVE: 1
MUSCULOSKELETAL NEGATIVE: 1
ENDOCRINE NEGATIVE: 1
GASTROINTESTINAL NEGATIVE: 1
NEUROLOGICAL NEGATIVE: 1
ALLERGIC/IMMUNOLOGIC NEGATIVE: 1
CONSTITUTIONAL NEGATIVE: 1

## 2021-08-30 NOTE — PROGRESS NOTES
Subjective      Patient ID: Debra Miranda is a 65 y.o. female.    She returns today for routine Medicare wellness check.  She states that she continues to be quite active and is a .      The following have been reviewed and updated as appropriate in this visit:       Review of Systems   Constitutional: Negative.    HENT: Negative.    Eyes: Negative.    Respiratory: Negative.    Cardiovascular: Negative.    Gastrointestinal: Negative.    Endocrine: Negative.    Genitourinary: Negative.    Musculoskeletal: Negative.    Skin: Negative.    Allergic/Immunologic: Negative.    Neurological: Negative.    Hematological: Negative.    Psychiatric/Behavioral: Negative.      Past Medical History:   Diagnosis Date   • Asthma    • Brain tumor (benign) (CMS/HCC)    • Fibromyalgia    • Headache    • Lymphoma, small follicular center-cell (CMS/HCC)    • Osteopenia      Family History   Problem Relation Age of Onset   • Heart failure Biological Mother    • Melanoma Biological Father      Social History     Socioeconomic History   • Marital status:      Spouse name: Not on file   • Number of children: Not on file   • Years of education: Not on file   • Highest education level: Not on file   Occupational History   • Not on file   Tobacco Use   • Smoking status: Never Smoker   • Smokeless tobacco: Never Used   Substance and Sexual Activity   • Alcohol use: No   • Drug use: No   • Sexual activity: Not on file   Other Topics Concern   • Not on file   Social History Narrative   • Not on file     Social Determinants of Health     Financial Resource Strain:    • Difficulty of Paying Living Expenses:    Food Insecurity:    • Worried About Running Out of Food in the Last Year:    • Ran Out of Food in the Last Year:    Transportation Needs:    • Lack of Transportation (Medical):    • Lack of Transportation (Non-Medical):    Physical Activity:    • Days of Exercise per Week:    • Minutes of Exercise per Session:    Stress:   "  • Feeling of Stress :    Social Connections:    • Frequency of Communication with Friends and Family:    • Frequency of Social Gatherings with Friends and Family:    • Attends Sabianism Services:    • Active Member of Clubs or Organizations:    • Attends Club or Organization Meetings:    • Marital Status:    Intimate Partner Violence:    • Fear of Current or Ex-Partner:    • Emotionally Abused:    • Physically Abused:    • Sexually Abused:      Allergies   Allergen Reactions   • Sudafed [Pseudoephedrine Hcl] Hives   • Wheat            Current Outpatient Medications   Medication Sig Dispense Refill   • calcium carbonate-vitamin D3 (CALCIUM 600 WITH VITAMIN D3) 600 mg(1,500mg) -400 unit capsule Take by mouth.     • FISH OIL-omega-3 fatty acids (FISH OIL) 340-1,000 mg capsule Take by mouth 2 (two) times a day.     • magnesium oxide 200 mg magnesium tablet Take by mouth.       No current facility-administered medications for this visit.     Objective     Visit Vitals  /70 (BP Location: Left upper arm, Patient Position: Sitting)   Pulse (!) 55   Temp 36.7 °C (98 °F) (Temporal)   Resp 16   Ht 1.6 m (5' 3\")   Wt 53.5 kg (118 lb)   SpO2 98%   BMI 20.90 kg/m²       Physical Exam  Vitals and nursing note reviewed.   Constitutional:       Appearance: She is well-developed.   Eyes:      Conjunctiva/sclera: Conjunctivae normal.   Neck:      Thyroid: No thyromegaly.   Cardiovascular:      Rate and Rhythm: Normal rate and regular rhythm.      Heart sounds: Normal heart sounds. No murmur heard.     Pulmonary:      Effort: Pulmonary effort is normal.   Abdominal:      General: Bowel sounds are normal.      Palpations: Abdomen is soft. There is no hepatomegaly, splenomegaly or mass.   Musculoskeletal:      Cervical back: Normal range of motion and neck supple.   Lymphadenopathy:      Cervical: No cervical adenopathy.   Skin:     General: Skin is warm and dry.   Neurological:      Mental Status: She is alert and oriented to " person, place, and time.   Psychiatric:         Behavior: Behavior normal.         Thought Content: Thought content normal.         Judgment: Judgment normal.         Assessment/Plan   Problem List Items Addressed This Visit        Nervous    Meningioma, cerebral (CMS/HCC)     Stable.  No evidence of recurrence.  She will continue to follow-up with oncology         Relevant Orders    CBC    Comprehensive metabolic panel      Other Visit Diagnoses     Encounter for Medicare annual wellness exam    -  Primary    Recheck annually    Hypothyroidism, unspecified type        Updated lab pending    Relevant Orders    Comprehensive metabolic panel    Lipid panel    TSH w reflex FT4    Colon cancer screening        Cologuard to be ordered at patient request    Relevant Orders    SCAN ONLY COLOGUARD        Sebastián Downs,   8/30/2021

## 2021-09-07 ENCOUNTER — TRANSCRIBE ORDERS (OUTPATIENT)
Dept: SCHEDULING | Age: 66
End: 2021-09-07
Payer: MEDICARE

## 2021-09-07 DIAGNOSIS — C85.89 OTHER SPECIFIED TYPES OF NON-HODGKIN LYMPHOMA, EXTRANODAL AND SOLID ORGAN SITES (CMS/HCC): Primary | ICD-10-CM

## 2021-09-07 LAB — COLOGUARD: POSITIVE

## 2021-09-09 LAB
ALBUMIN SERPL-MCNC: 4.3 G/DL (ref 3.8–4.8)
ALBUMIN/GLOB SERPL: 2.2 {RATIO} (ref 1.2–2.2)
ALP SERPL-CCNC: 77 IU/L (ref 48–121)
ALT SERPL-CCNC: 39 IU/L (ref 0–32)
AST SERPL-CCNC: 34 IU/L (ref 0–40)
BILIRUB SERPL-MCNC: 0.6 MG/DL (ref 0–1.2)
BUN SERPL-MCNC: 16 MG/DL (ref 8–27)
BUN/CREAT SERPL: 18 (ref 12–28)
CALCIUM SERPL-MCNC: 9.4 MG/DL (ref 8.7–10.3)
CHLORIDE SERPL-SCNC: 100 MMOL/L (ref 96–106)
CHOLEST SERPL-MCNC: 187 MG/DL (ref 100–199)
CO2 SERPL-SCNC: 25 MMOL/L (ref 20–29)
CREAT SERPL-MCNC: 0.87 MG/DL (ref 0.57–1)
ERYTHROCYTE [DISTWIDTH] IN BLOOD BY AUTOMATED COUNT: 12.6 % (ref 11.7–15.4)
GLOBULIN SER CALC-MCNC: 2 G/DL (ref 1.5–4.5)
GLUCOSE SERPL-MCNC: 98 MG/DL (ref 65–99)
HCT VFR BLD AUTO: 41.6 % (ref 34–46.6)
HDLC SERPL-MCNC: 85 MG/DL
HGB BLD-MCNC: 14.1 G/DL (ref 11.1–15.9)
LAB CORP EGFR IF AFRICN AM: 80 ML/MIN/1.73
LAB CORP EGFR IF NONAFRICN AM: 70 ML/MIN/1.73
LDLC SERPL CALC-MCNC: 92 MG/DL (ref 0–99)
MCH RBC QN AUTO: 30.6 PG (ref 26.6–33)
MCHC RBC AUTO-ENTMCNC: 33.9 G/DL (ref 31.5–35.7)
MCV RBC AUTO: 90 FL (ref 79–97)
PLATELET # BLD AUTO: 195 X10E3/UL (ref 150–450)
POTASSIUM SERPL-SCNC: 4.7 MMOL/L (ref 3.5–5.2)
PROT SERPL-MCNC: 6.3 G/DL (ref 6–8.5)
RBC # BLD AUTO: 4.61 X10E6/UL (ref 3.77–5.28)
SODIUM SERPL-SCNC: 138 MMOL/L (ref 134–144)
T4 FREE SERPL-MCNC: 1.24 NG/DL (ref 0.82–1.77)
TRIGL SERPL-MCNC: 52 MG/DL (ref 0–149)
TSH SERPL DL<=0.005 MIU/L-ACNC: 2.74 UIU/ML (ref 0.45–4.5)
VLDLC SERPL CALC-MCNC: 10 MG/DL (ref 5–40)
WBC # BLD AUTO: 2.9 X10E3/UL (ref 3.4–10.8)

## 2021-09-13 ENCOUNTER — OFFICE VISIT (OUTPATIENT)
Dept: URBAN - METROPOLITAN AREA CLINIC 23 | Facility: CLINIC | Age: 66
End: 2021-09-13

## 2021-10-01 ENCOUNTER — HOSPITAL ENCOUNTER (OUTPATIENT)
Dept: RADIOLOGY | Facility: HOSPITAL | Age: 66
Discharge: HOME | End: 2021-10-01
Attending: NEUROLOGICAL SURGERY
Payer: MEDICARE

## 2021-10-01 DIAGNOSIS — C85.89 OTHER SPECIFIED TYPES OF NON-HODGKIN LYMPHOMA, EXTRANODAL AND SOLID ORGAN SITES (CMS/HCC): ICD-10-CM

## 2021-10-01 RX ORDER — GADOBUTROL 604.72 MG/ML
5.3 INJECTION INTRAVENOUS ONCE
Status: COMPLETED | OUTPATIENT
Start: 2021-10-01 | End: 2021-10-01

## 2021-10-01 RX ADMIN — GADOBUTROL 5.3 ML: 604.72 INJECTION INTRAVENOUS at 07:55

## 2021-10-04 ENCOUNTER — TELEPHONE (OUTPATIENT)
Dept: FAMILY MEDICINE | Facility: CLINIC | Age: 66
End: 2021-10-04

## 2021-10-04 NOTE — TELEPHONE ENCOUNTER
Patient called stating that she did the Cologaurd a month ago and she hasn't heard back with results. Patient would like to know the status of her results. Please advise

## 2021-10-20 NOTE — TELEPHONE ENCOUNTER
Patient called to check the status of the cologaurd results because she has not had a callback in regards to the results. Please advise

## 2021-10-28 ENCOUNTER — OFFICE VISIT (OUTPATIENT)
Dept: URBAN - METROPOLITAN AREA CLINIC 23 | Facility: CLINIC | Age: 66
End: 2021-10-28
Payer: MEDICARE

## 2021-10-28 ENCOUNTER — WEB ENCOUNTER (OUTPATIENT)
Dept: URBAN - METROPOLITAN AREA CLINIC 23 | Facility: CLINIC | Age: 66
End: 2021-10-28

## 2021-10-28 VITALS
SYSTOLIC BLOOD PRESSURE: 143 MMHG | HEART RATE: 67 BPM | WEIGHT: 149 LBS | HEIGHT: 64 IN | TEMPERATURE: 98 F | BODY MASS INDEX: 25.44 KG/M2 | DIASTOLIC BLOOD PRESSURE: 86 MMHG

## 2021-10-28 DIAGNOSIS — Z12.11 SCREEN FOR COLON CANCER: ICD-10-CM

## 2021-10-28 PROCEDURE — 993 AGA: Performed by: INTERNAL MEDICINE

## 2021-10-28 PROCEDURE — 99242 OFF/OP CONSLTJ NEW/EST SF 20: CPT | Performed by: INTERNAL MEDICINE

## 2021-10-28 NOTE — HPI-TODAY'S VISIT:
67 yo   female   presenting for evalution for colon cancer screening referred by Zhane MEDINA/Dr. Arlet Cheek  at [facility] .  A copy this note will be sent to the referring physician.    prior colonoscopy-  10 years ago no findings -denies family history of colon cancer or colon polyps  -no change in bowel movements, bleeding, abdominal pain, weight loss.    -denies prior difficulty with colonoscopy  -denies prior difficulty with anesthesia  denies blood thinners denies hx of heart or lung disease  denies abdominal surgeries  etoh- a glass of wine no tobacco owns an [employer]

## 2021-11-16 NOTE — TELEPHONE ENCOUNTER
Patient calling to check the status of seymour. Patient states that the sample was received on 09/08 @ 11:30am by seymour but she still hasn't received the results. Please  advise

## 2021-11-19 ENCOUNTER — TELEPHONE (OUTPATIENT)
Dept: FAMILY MEDICINE | Facility: CLINIC | Age: 66
End: 2021-11-19

## 2021-11-19 NOTE — TELEPHONE ENCOUNTER
BRENNAI...    Pt called as instructed -     She called [facility] to schedule an appt as a f/u to her Cologuard test result. They're booking into March now. She stated they can't even schedule her now because they don't have March schedule available.

## 2021-11-19 NOTE — TELEPHONE ENCOUNTER
I called [facility] on 9/18, as we hadn't received any report of the results from Sept sample. I got a verbal over the phone that sample was positive for blood in stool. I requested they fax the report. I let Dr. Downs know results were positive and asked how to proceed. Dr. Downs advised he'd speak with patient directly.

## 2022-01-05 PROBLEM — 275978004 SCREENING FOR MALIGNANT NEOPLASM OF COLON: Status: ACTIVE | Noted: 2022-01-05

## 2022-01-12 ENCOUNTER — APPOINTMENT (RX ONLY)
Dept: URBAN - METROPOLITAN AREA CLINIC 374 | Facility: CLINIC | Age: 67
Setting detail: DERMATOLOGY
End: 2022-01-12

## 2022-01-12 DIAGNOSIS — L82.1 OTHER SEBORRHEIC KERATOSIS: ICD-10-CM

## 2022-01-12 DIAGNOSIS — Z71.89 OTHER SPECIFIED COUNSELING: ICD-10-CM

## 2022-01-12 DIAGNOSIS — L81.4 OTHER MELANIN HYPERPIGMENTATION: ICD-10-CM

## 2022-01-12 DIAGNOSIS — Z80.8 FAMILY HISTORY OF MALIGNANT NEOPLASM OF OTHER ORGANS OR SYSTEMS: ICD-10-CM

## 2022-01-12 DIAGNOSIS — D22 MELANOCYTIC NEVI: ICD-10-CM

## 2022-01-12 DIAGNOSIS — D36.1 BENIGN NEOPLASM OF PERIPHERAL NERVES AND AUTONOMIC NERVOUS SYSTEM: ICD-10-CM

## 2022-01-12 DIAGNOSIS — D18.0 HEMANGIOMA: ICD-10-CM

## 2022-01-12 PROBLEM — D22.61 MELANOCYTIC NEVI OF RIGHT UPPER LIMB, INCLUDING SHOULDER: Status: ACTIVE | Noted: 2022-01-12

## 2022-01-12 PROBLEM — D22.5 MELANOCYTIC NEVI OF TRUNK: Status: ACTIVE | Noted: 2022-01-12

## 2022-01-12 PROBLEM — D36.15 BENIGN NEOPLASM OF PERIPHERAL NERVES AND AUTONOMIC NERVOUS SYSTEM OF ABDOMEN: Status: ACTIVE | Noted: 2022-01-12

## 2022-01-12 PROBLEM — D18.01 HEMANGIOMA OF SKIN AND SUBCUTANEOUS TISSUE: Status: ACTIVE | Noted: 2022-01-12

## 2022-01-12 PROCEDURE — ? FULL BODY SKIN EXAM

## 2022-01-12 PROCEDURE — ? SUNSCREEN RECOMMENDATIONS

## 2022-01-12 PROCEDURE — ? COUNSELING

## 2022-01-12 PROCEDURE — 99213 OFFICE O/P EST LOW 20 MIN: CPT

## 2022-01-12 ASSESSMENT — LOCATION ZONE DERM
LOCATION ZONE: ARM
LOCATION ZONE: TRUNK
LOCATION ZONE: LEG

## 2022-01-12 ASSESSMENT — LOCATION SIMPLE DESCRIPTION DERM
LOCATION SIMPLE: RIGHT LOWER BACK
LOCATION SIMPLE: UPPER BACK
LOCATION SIMPLE: RIGHT SHOULDER
LOCATION SIMPLE: LEFT SHOULDER
LOCATION SIMPLE: RIGHT PRETIBIAL REGION
LOCATION SIMPLE: LEFT UPPER BACK
LOCATION SIMPLE: ABDOMEN
LOCATION SIMPLE: LEFT PRETIBIAL REGION

## 2022-01-12 ASSESSMENT — LOCATION DETAILED DESCRIPTION DERM
LOCATION DETAILED: RIGHT DISTAL PRETIBIAL REGION
LOCATION DETAILED: LEFT SUPERIOR UPPER BACK
LOCATION DETAILED: LEFT POSTERIOR SHOULDER
LOCATION DETAILED: RIGHT POSTERIOR SHOULDER
LOCATION DETAILED: LEFT PROXIMAL PRETIBIAL REGION
LOCATION DETAILED: RIGHT SUPERIOR MEDIAL LOWER BACK
LOCATION DETAILED: PERIUMBILICAL SKIN
LOCATION DETAILED: INFERIOR THORACIC SPINE
LOCATION DETAILED: EPIGASTRIC SKIN

## 2022-01-12 NOTE — PROCEDURE: SUNSCREEN RECOMMENDATIONS
Products Recommended: sunscreen with SPF 70
Detail Level: Generalized
General Sunscreen Counseling: I recommended a broad spectrum sunscreen with a SPF of 30 or higher.  I explained that SPF 30 sunscreens block approximately 97 percent of the sun's harmful rays.  Sunscreens should be applied at least 15 minutes prior to expected sun exposure and then every 2 hours after that as long as sun exposure continues. If swimming or exercising sunscreen should be reapplied every 45 minutes to an hour after getting wet or sweating.  One ounce, or the equivalent of a shot glass full of sunscreen, is adequate to protect the skin not covered by a bathing suit. I also recommended a lip balm with a sunscreen as well. Sun protective clothing can be used in lieu of sunscreen but must be worn the entire time you are exposed to the sun's rays.

## 2022-01-27 ENCOUNTER — OFFICE VISIT (OUTPATIENT)
Dept: URBAN - METROPOLITAN AREA SURGERY CENTER 15 | Facility: SURGERY CENTER | Age: 67
End: 2022-01-27

## 2022-03-21 ENCOUNTER — TRANSCRIBE ORDERS (OUTPATIENT)
Dept: SCHEDULING | Age: 67
End: 2022-03-21

## 2022-03-21 DIAGNOSIS — C85.10 UNSPECIFIED B-CELL LYMPHOMA, UNSPECIFIED SITE (CMS/HCC): Primary | ICD-10-CM

## 2022-04-05 ENCOUNTER — HOSPITAL ENCOUNTER (OUTPATIENT)
Dept: RADIOLOGY | Facility: HOSPITAL | Age: 67
Discharge: HOME | End: 2022-04-05
Attending: NURSE PRACTITIONER
Payer: MEDICARE

## 2022-04-05 DIAGNOSIS — C85.10 UNSPECIFIED B-CELL LYMPHOMA, UNSPECIFIED SITE (CMS/HCC): ICD-10-CM

## 2022-04-05 RX ORDER — GADOBUTROL 604.72 MG/ML
5.3 INJECTION INTRAVENOUS ONCE
Status: COMPLETED | OUTPATIENT
Start: 2022-04-05 | End: 2022-04-05

## 2022-04-05 RX ADMIN — GADOBUTROL 5.3 ML: 604.72 INJECTION INTRAVENOUS at 07:58

## 2022-09-19 ENCOUNTER — TRANSCRIBE ORDERS (OUTPATIENT)
Dept: SCHEDULING | Age: 67
End: 2022-09-19

## 2022-10-11 ENCOUNTER — TRANSCRIBE ORDERS (OUTPATIENT)
Dept: SCHEDULING | Age: 67
End: 2022-10-11

## 2022-10-11 DIAGNOSIS — C85.11 UNSPECIFIED B-CELL LYMPHOMA, LYMPH NODES OF HEAD, FACE, AND NECK (CMS/HCC): Primary | ICD-10-CM

## 2022-10-29 ENCOUNTER — HOSPITAL ENCOUNTER (OUTPATIENT)
Dept: RADIOLOGY | Facility: HOSPITAL | Age: 67
Discharge: HOME | End: 2022-10-29
Attending: RADIOLOGY
Payer: MEDICARE

## 2022-10-29 DIAGNOSIS — C85.11 UNSPECIFIED B-CELL LYMPHOMA, LYMPH NODES OF HEAD, FACE, AND NECK (CMS/HCC): ICD-10-CM

## 2022-10-29 RX ORDER — GADOBUTROL 604.72 MG/ML
5.3 INJECTION INTRAVENOUS ONCE
Status: COMPLETED | OUTPATIENT
Start: 2022-10-29 | End: 2022-10-29

## 2022-10-29 RX ADMIN — GADOBUTROL 5.3 MMOL: 604.72 INJECTION INTRAVENOUS at 08:29

## 2022-11-09 ENCOUNTER — OFFICE VISIT (OUTPATIENT)
Dept: FAMILY MEDICINE | Facility: CLINIC | Age: 67
End: 2022-11-09
Payer: MEDICARE

## 2022-11-09 VITALS
HEIGHT: 63 IN | OXYGEN SATURATION: 99 % | WEIGHT: 118 LBS | DIASTOLIC BLOOD PRESSURE: 82 MMHG | BODY MASS INDEX: 20.91 KG/M2 | RESPIRATION RATE: 18 BRPM | SYSTOLIC BLOOD PRESSURE: 128 MMHG | TEMPERATURE: 97.9 F | HEART RATE: 68 BPM

## 2022-11-09 DIAGNOSIS — Z13.220 SCREENING CHOLESTEROL LEVEL: ICD-10-CM

## 2022-11-09 DIAGNOSIS — Z00.00 MEDICARE ANNUAL WELLNESS VISIT, SUBSEQUENT: Primary | ICD-10-CM

## 2022-11-09 DIAGNOSIS — C85.10 LOW GRADE B-CELL LYMPHOMA (CMS/HCC): ICD-10-CM

## 2022-11-09 DIAGNOSIS — M85.80 OSTEOPENIA, UNSPECIFIED LOCATION: ICD-10-CM

## 2022-11-09 PROCEDURE — G0439 PPPS, SUBSEQ VISIT: HCPCS | Performed by: FAMILY MEDICINE

## 2022-11-09 ASSESSMENT — MINI COG
TOTAL SCORE: 5
COMPLETED: YES

## 2022-11-09 ASSESSMENT — PATIENT HEALTH QUESTIONNAIRE - PHQ9: SUM OF ALL RESPONSES TO PHQ9 QUESTIONS 1 & 2: 0

## 2022-11-09 NOTE — PATIENT INSTRUCTIONS
Your Personalized Prevention Plan Services (PPPS)    Preventive Services Checklist (Assumes Average Risk Unless Otherwise Noted):    Health Maintenance Topics with due status: Overdue       Topic Date Due    Breast Cancer Screening Never done    Pneumococcal (65 years and older) Never done    Hepatitis C Screening Never done    DTaP, Tdap, and Td Vaccines Never done    Zoster Vaccine Never done    DEXA Scan 05/25/2019    Influenza Vaccine Never done     Health Maintenance Topics with due status: Not Due       Topic Last Completion Date    Colorectal Cancer Screening 09/07/2021    Medicare Annual Wellness Visit 11/09/2022     Health Maintenance Topics with due status: Completed       Topic Last Completion Date    Annual Falls Risk Screening 11/02/2022    COVID-19 Vaccine 11/02/2022     Health Maintenance Topics with due status: Aged Out       Topic Date Due    Meningococcal ACWY Aged Out    HIB Vaccines Aged Out    IPV Vaccines Aged Out    HPV Vaccines Aged Out       You May Be Eligible for These Additional Preventive Services   (Assumes Average Risk Unless Otherwise Noted)  Diabetes Screening Any 1 risk factor: hypertension, dyslipidemia, obesity, high glucose; or Any 2 risk factors: >=64yo, overweight, family history diabetes (covered every 6 months)   Hepatitis C Screening Any 1 risk factor: 1) blood transfusion before 1992,   2) current or past injection drug use (annually for high risk; if born between 9825-3992, see above for status).   Vaccine: Hepatitis B As necessary if at-risk: hemophilia, ESRD, diabetes, living with individual infected with hep B, healthcare worker with frequent contact with blood/bodily fluids (series covered once)   Sexually Transmitted Diseases (STDs) As necessary chlamydia, gonorrhea, syphilis, hepatitis B (covered annually)  HIV if any 1 risk factor present: 1) <14yo or >64yo and at increased risk or 2) 15-64yo and ask for it (covered annually)   Lung Cancer  Screening Low dose chest CT if all three risk factors: 1) 50-78yo, 2) smoker or quit within last 15y, 3) >=20 pack years (covered annually).  No results found for this or any previous visit.       Cholesterol Screening Both risk factors: 1) >=19yo and 2)  increased risk coronary artery disease (covered every 5 years).     Breast Cancer Screening Covered once 35-40yo, annually >=39yo (if >=49yo, see above for status).         Health Risk Factors with Personalized Education:  ----------------------------------------------------------------------------------------------------------------------  Controlling Your Blood Pressure  · Maintain a normal weight (body mass index between 18.5 and 24.9).  · Eat more fruit, vegetables and low-fat dairy.  · Eat less saturated fat and total fat.  · Lower your sodium (salt) intake.  Try to stay under 1500 mg per day, but if you cannot get your intake to be that low, at least lower it by 1000 mg.  · Stay active.  Try to get at least 90 to 150 minutes of exercise per week.  Try brisk walking, swimming, bicycling or dancing.  · Limit alcohol intake.  When you do consume alcohol, drink no more than 1 drink per day.  · If you have been prescribed medication, take it regularly and exactly as prescribed.  Let your PCP know if you have any problems or questions about your medication.  · Check your blood pressure at home or at the store.  Write down your readings and share them with your PCP  ----------------------------------------------------------------------------------------------------------------------  Controlling Your Osteopenia, Strengthening Your Bones  · Try to get at least 90 to 150 minutes of weight-bearing exercise per week.  · Ensure intake of at least 1200mg of calcium per day.  Eat foods high in calcium like milk and other dairy, green vegetables, fruit, canned fish with soft and edible bones, nuts, calcium-set tofu.  Some foods are calcium-fortified, like bread, cereal,  fruit juices and mineral water.  · Help your body make vitamin D by getting 10-15 minutes per day of sunlight.    · Ensure intake of at least 600IU of vitamin D per day.  Eat foods high in vitamin D like oily fish (salmon, sardines, mackerel) and eggs.  Some foods are fortified with vitamin D, like dairy and cereals.  · Avoid high amounts of caffeine and salt, since they can cause the body to loose calcium.  · Limit alcohol intake, since it is associated with weaker bones and is associated with falls and fractures.  · Limit intake of fizzy drinks.  · If you have been prescribed medication, take it regularly and exactly as prescribed.  Let your PCP know if you have any problems or questions about your medication  ----------------------------------------------------------------------------------------------------------------------  Reducing Your Risk of Falls  · Tell your PCP if any of your medications make you feel tired, dizzy, lightheaded or off-balance.  · Maintain coordination, flexibility and balance by ensuring regular physical activity.  · Limit alcohol intake to 1 drink per day.  Consider avoiding all alcohol intake.  · Ensure good vision.  Visit an ophthalmologist or optometrist regularly for vision screening or to make sure your glasses / contact lens prescription is correct.  If you need glasses or contacts, wear them.  When you get new glasses or contacts, take time to get used to them.  Do not wear sunglasses or tinted lenses when indoors.  · Ensure good hearing.  Have your hearing checked if you are having trouble hearing, or family and friends think you cannot hear them.  If you need a hearing aid, be sure it fits well and wear it.  · Get enough rest.  Ensure about 7-9 hours of sleep every day.  · Get up slowly from your bed or chairs.  Do not start walking until you are sure you feel steady.  · Wear non-skid, rubber-soled, low-heeled shoes.  Do not walk in socks, or in shoes and slippers with smooth  soles.  · If your PCP or therapist recommends using a cane or walker, use it regularly.  · Make your home safer.  Increase lighting throughout the house, especially at the top and bottom of stairs.  Ensure lighting is easily turned on when getting up in the middle of the night.  Make sure there are two secure rails on all stairs.  Install grab bars in the bathtub / shower and near the toilet.  Consider using a shower chair and / or a hand-held shower.  · Spread sand or salt on icy surfaces.  Beware of wet surfaces, which can be icy.  · Tell your PCP if you have fallen.

## 2022-11-09 NOTE — ASSESSMENT & PLAN NOTE
We will perform cholesterol screening.    Patient had colonoscopy within the last year.  We will attempt to obtain records from Dr. Garcia's office.  We will perform diabetes screening.  Patient follows with ophthalmology routinely.  Declines pneumococcus vaccination today.

## 2022-11-09 NOTE — ASSESSMENT & PLAN NOTE
Declines any further DEXA screenings.  Patient is active on a daily basis and would not want any treatment for osteopenia/osteoporosis.

## 2022-11-09 NOTE — PROGRESS NOTES
"  Subjective     Debra Miranda is a 67 y.o. female who presents for a subsequent annual wellness visit.     Patient Care Team:  Yash Pop MD as PCP - General (Family Medicine)          Subjective      Patient ID: Debra Miranda is a 67 y.o. female.  1955      Patient reports office for Medicare annual wellness exam.    Patient currently lives at home with  and is very active.  Patient eats healthy and exercises with heavy cardiovascular workouts on a daily basis.  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  Cognitively, patient has done well since surgery.  Patient gets frequent surveillance with MRI of the brain.  Patient tries to stay healthy and avoid medications and takes supplements.      The following have been reviewed and updated as appropriate in this visit:   Tobacco  Allergies  Meds  Problems  Med Hx  Surg Hx  Fam Hx       Review of Systems   All other systems reviewed and are negative.      Objective     Vitals:    11/09/22 1000   BP: 128/82   BP Location: Left upper arm   Patient Position: Sitting   Pulse: 68   Resp: 18   Temp: 36.6 °C (97.9 °F)   TempSrc: Temporal   SpO2: 99%   Weight: 53.5 kg (118 lb)   Height: 1.6 m (5' 3\")     Body mass index is 20.9 kg/m².    Physical Exam  Vitals and nursing note reviewed.   Constitutional:       Appearance: She is well-developed.   HENT:      Right Ear: Tympanic membrane and ear canal normal.      Left Ear: Tympanic membrane and ear canal normal.   Eyes:      Conjunctiva/sclera: Conjunctivae normal.   Neck:      Vascular: No carotid bruit.   Cardiovascular:      Rate and Rhythm: Normal rate and regular rhythm.   Pulmonary:      Effort: Pulmonary effort is normal.      Breath sounds: Normal breath sounds.   Abdominal:      General: Bowel sounds are normal.      Palpations: Abdomen is soft. There is no mass.      Tenderness: There is " no abdominal tenderness.   Musculoskeletal:         General: Normal range of motion.      Cervical back: Normal range of motion and neck supple.   Lymphadenopathy:      Cervical: No cervical adenopathy.   Skin:     General: Skin is warm and dry.   Neurological:      General: No focal deficit present.      Mental Status: She is alert. Mental status is at baseline.   Psychiatric:         Mood and Affect: Mood normal.         Assessment/Plan   Diagnoses and all orders for this visit:    Medicare annual wellness visit, subsequent (Primary)  Assessment & Plan:  We will perform cholesterol screening.    Patient had colonoscopy within the last year.  We will attempt to obtain records from Dr. Garcia's office.  We will perform diabetes screening.  Patient follows with ophthalmology routinely.  Declines pneumococcus vaccination today.      Low grade B-cell lymphoma (CMS/HCC)  Assessment & Plan:  Patient keeps routine follow-up with oncology at [place] for surveillance.    Orders:  -     Comprehensive metabolic panel; Future  -     CBC and Differential; Future    Screening cholesterol level  -     Comprehensive metabolic panel; Future  -     CBC and Differential; Future  -     Lipid Prof w Refl; Future    Osteopenia, unspecified location  Assessment & Plan:  Declines any further DEXA screenings.  Patient is active on a daily basis and would not want any treatment for osteopenia/osteoporosis.          Comprehensive Medical and Social History  Patient Active Problem List   Diagnosis    Headache due to intracranial disease    Allergic rhinitis    Atrophic vaginitis    Brain mass    Low grade B-cell lymphoma (CMS/HCC)    Osteopenia    Medicare annual wellness visit, subsequent     Past Medical History:   Diagnosis Date    Asthma     Brain tumor (benign) (CMS/HCC)     Fibromyalgia     Headache     Lymphoma, small follicular center-cell (CMS/HCC)     Osteopenia     Other chronic sinusitis 1/16/2012    Overview:      Past  "Surgical History:   Procedure Laterality Date    BONE MARROW BIOPSY      CRANIECTOMY / CRANIOTOMY FOR EXCISION OF BRAIN TUMOR      SINUS SURGERY       Allergies   Allergen Reactions    Sudafed [Pseudoephedrine Hcl] Hives    Wheat GI intolerance     SENSITIVE      Current Outpatient Medications   Medication Sig Dispense Refill    calcium carbonate-vitamin D3 600 mg-10 mcg (400 unit) capsule Take by mouth.      FISH OIL-omega-3 fatty acids (FISH OIL) 340-1,000 mg capsule Take by mouth 2 (two) times a day.      magnesium oxide 200 mg magnesium tablet Take by mouth.       No current facility-administered medications for this visit.     Social History     Tobacco Use    Smoking status: Never    Smokeless tobacco: Never   Substance Use Topics    Alcohol use: No    Drug use: No     Family History   Problem Relation Age of Onset    Heart failure Biological Mother     Melanoma Biological Father        Objective   Vitals  Vitals:    11/09/22 1000   BP: 128/82   BP Location: Left upper arm   Patient Position: Sitting   Pulse: 68   Resp: 18   Temp: 36.6 °C (97.9 °F)   TempSrc: Temporal   SpO2: 99%   Weight: 53.5 kg (118 lb)   Height: 1.6 m (5' 3\")     Body mass index is 20.9 kg/m².    Advanced Care Plan                                        PHQ  Will the patient answer the depression questions?: Yes   Little interest or pleasure in doing things: Not at all   Feeling down, depressed, or hopeless: Not at all   Depression Risk: 0                                             Mini Cog  Completed: Yes  Score: 5  Result: Negative      Get Up and Go  Result: Pass    STEADI Falls Risk  One or more falls in the last year: Yes   How many times: 1   Was the patient injured in any fall: No   Has trouble stepping up onto a curb: No   Advised to use a cane or walker to get around safely: No   Often has to rush to the toilet: No   Feels unsteady when walking: No   Has lost some feeling in feet: No   Often feels sad or depressed: " No   Steadies self on furniture while walking at home: No   Takes medication that makes him/her feel lightheaded or more tired than usual: No   Worried about falling: No   Takes medicine to sleep or improve mood: No   Needs to push with hands when rising from a chair: No   Falls screen completed: Yes     Hearing and Vision Screening  No results found.  See HRA for relevant hearing screening response.    Assessment/Plan   Diagnoses and all orders for this visit:    Medicare annual wellness visit, subsequent (Primary)  Assessment & Plan:  We will perform cholesterol screening.    Patient had colonoscopy within the last year.  We will attempt to obtain records from Dr. Garcia's office.  We will perform diabetes screening.  Patient follows with ophthalmology routinely.  Declines pneumococcus vaccination today.      Low grade B-cell lymphoma (CMS/HCC)  Assessment & Plan:  Patient keeps routine follow-up with oncology at [place] for surveillance.    Orders:  -     Comprehensive metabolic panel; Future  -     CBC and Differential; Future    Screening cholesterol level  -     Comprehensive metabolic panel; Future  -     CBC and Differential; Future  -     Lipid Prof w Refl; Future    Osteopenia, unspecified location  Assessment & Plan:  Declines any further DEXA screenings.  Patient is active on a daily basis and would not want any treatment for osteopenia/osteoporosis.        See Patient Instructions (the written plan) which was given to the patient for PPPS and health risk factors with interventions.

## 2022-11-29 LAB
BASOPHILS # BLD AUTO: 0 X10E3/UL (ref 0–0.2)
BASOPHILS NFR BLD AUTO: 1 %
EOSINOPHIL # BLD AUTO: 0.1 X10E3/UL (ref 0–0.4)
EOSINOPHIL NFR BLD AUTO: 2 %
ERYTHROCYTE [DISTWIDTH] IN BLOOD BY AUTOMATED COUNT: 12.3 % (ref 11.7–15.4)
HCT VFR BLD AUTO: 41.2 % (ref 34–46.6)
HGB BLD-MCNC: 14.2 G/DL (ref 11.1–15.9)
IMM GRANULOCYTES # BLD AUTO: 0 X10E3/UL (ref 0–0.1)
IMM GRANULOCYTES NFR BLD AUTO: 0 %
LYMPHOCYTES # BLD AUTO: 0.6 X10E3/UL (ref 0.7–3.1)
LYMPHOCYTES NFR BLD AUTO: 22 %
MCH RBC QN AUTO: 30.5 PG (ref 26.6–33)
MCHC RBC AUTO-ENTMCNC: 34.5 G/DL (ref 31.5–35.7)
MCV RBC AUTO: 88 FL (ref 79–97)
MONOCYTES # BLD AUTO: 0.3 X10E3/UL (ref 0.1–0.9)
MONOCYTES NFR BLD AUTO: 10 %
NEUTROPHILS # BLD AUTO: 1.6 X10E3/UL (ref 1.4–7)
NEUTROPHILS NFR BLD AUTO: 65 %
PLATELET # BLD AUTO: 180 X10E3/UL (ref 150–450)
RBC # BLD AUTO: 4.66 X10E6/UL (ref 3.77–5.28)
WBC # BLD AUTO: 2.5 X10E3/UL (ref 3.4–10.8)

## 2022-11-30 LAB
ALBUMIN SERPL-MCNC: 4.7 G/DL (ref 3.8–4.8)
ALBUMIN/GLOB SERPL: 2.4 {RATIO} (ref 1.2–2.2)
ALP SERPL-CCNC: 78 IU/L (ref 44–121)
ALT SERPL-CCNC: 33 IU/L (ref 0–32)
AST SERPL-CCNC: 34 IU/L (ref 0–40)
BILIRUB SERPL-MCNC: 0.6 MG/DL (ref 0–1.2)
BUN SERPL-MCNC: 16 MG/DL (ref 8–27)
BUN/CREAT SERPL: 20 (ref 12–28)
CALCIUM SERPL-MCNC: 9.4 MG/DL (ref 8.7–10.3)
CHLORIDE SERPL-SCNC: 100 MMOL/L (ref 96–106)
CHOLEST SERPL-MCNC: 180 MG/DL (ref 100–199)
CO2 SERPL-SCNC: 25 MMOL/L (ref 20–29)
CREAT SERPL-MCNC: 0.81 MG/DL (ref 0.57–1)
EGFRCR SERPLBLD CKD-EPI 2021: 79 ML/MIN/1.73
GLOBULIN SER CALC-MCNC: 2 G/DL (ref 1.5–4.5)
GLUCOSE SERPL-MCNC: 91 MG/DL (ref 70–99)
HDLC SERPL-MCNC: 80 MG/DL
LDLC SERPL CALC-MCNC: 91 MG/DL (ref 0–99)
LDLC/HDLC SERPL: 1.1 RATIO (ref 0–3.2)
POTASSIUM SERPL-SCNC: 4.6 MMOL/L (ref 3.5–5.2)
PROT SERPL-MCNC: 6.7 G/DL (ref 6–8.5)
SODIUM SERPL-SCNC: 139 MMOL/L (ref 134–144)
TRIGL SERPL-MCNC: 44 MG/DL (ref 0–149)
VLDLC SERPL CALC-MCNC: 9 MG/DL (ref 5–40)

## 2023-04-10 ENCOUNTER — TRANSCRIBE ORDERS (OUTPATIENT)
Dept: SCHEDULING | Age: 68
End: 2023-04-10

## 2023-04-10 DIAGNOSIS — C85.11 UNSPECIFIED B-CELL LYMPHOMA, LYMPH NODES OF HEAD, FACE, AND NECK (CMS/HCC): Primary | ICD-10-CM

## 2023-04-12 ENCOUNTER — OFFICE VISIT (OUTPATIENT)
Dept: URBAN - METROPOLITAN AREA SURGERY CENTER 15 | Facility: SURGERY CENTER | Age: 68
End: 2023-04-12
Payer: MEDICARE

## 2023-04-12 DIAGNOSIS — Z12.11 AVERAGE RISK FOR CRC. DUE TO PT'S CO-MORBID STATE WITH END STAGE DEMENTIA, HIGH RISK FOR ANESTHESIA (PER NEUROLOGY); INABILITY TO TAKE A BOWEL PREP....WOULD NOT ADVISE ANY COLORECTAL CANCER SCREENING INCLUDING STOOL TEST FOR FECAL BLOOD.: ICD-10-CM

## 2023-04-12 PROCEDURE — G0121 COLON CA SCRN NOT HI RSK IND: HCPCS | Performed by: INTERNAL MEDICINE

## 2023-04-12 PROCEDURE — G8907 PT DOC NO EVENTS ON DISCHARG: HCPCS | Performed by: INTERNAL MEDICINE

## 2023-04-25 ENCOUNTER — HOSPITAL ENCOUNTER (OUTPATIENT)
Dept: RADIOLOGY | Facility: HOSPITAL | Age: 68
Discharge: HOME | End: 2023-04-25
Attending: NURSE PRACTITIONER
Payer: MEDICARE

## 2023-04-25 DIAGNOSIS — C85.11 UNSPECIFIED B-CELL LYMPHOMA, LYMPH NODES OF HEAD, FACE, AND NECK (CMS/HCC): ICD-10-CM

## 2023-04-25 RX ORDER — GADOBUTROL 604.72 MG/ML
5.3 INJECTION INTRAVENOUS ONCE
Status: COMPLETED | OUTPATIENT
Start: 2023-04-25 | End: 2023-04-25

## 2023-04-25 RX ADMIN — GADOBUTROL 5.3 ML: 604.72 INJECTION INTRAVENOUS at 09:12

## 2023-05-10 ENCOUNTER — APPOINTMENT (RX ONLY)
Dept: URBAN - METROPOLITAN AREA CLINIC 374 | Facility: CLINIC | Age: 68
Setting detail: DERMATOLOGY
End: 2023-05-10

## 2023-05-10 DIAGNOSIS — D36.1 BENIGN NEOPLASM OF PERIPHERAL NERVES AND AUTONOMIC NERVOUS SYSTEM: ICD-10-CM

## 2023-05-10 DIAGNOSIS — L82.1 OTHER SEBORRHEIC KERATOSIS: ICD-10-CM

## 2023-05-10 DIAGNOSIS — Z80.8 FAMILY HISTORY OF MALIGNANT NEOPLASM OF OTHER ORGANS OR SYSTEMS: ICD-10-CM

## 2023-05-10 DIAGNOSIS — D22 MELANOCYTIC NEVI: ICD-10-CM

## 2023-05-10 DIAGNOSIS — L81.4 OTHER MELANIN HYPERPIGMENTATION: ICD-10-CM

## 2023-05-10 DIAGNOSIS — D18.0 HEMANGIOMA: ICD-10-CM

## 2023-05-10 DIAGNOSIS — Z71.89 OTHER SPECIFIED COUNSELING: ICD-10-CM

## 2023-05-10 PROBLEM — D18.01 HEMANGIOMA OF SKIN AND SUBCUTANEOUS TISSUE: Status: ACTIVE | Noted: 2023-05-10

## 2023-05-10 PROBLEM — D36.15 BENIGN NEOPLASM OF PERIPHERAL NERVES AND AUTONOMIC NERVOUS SYSTEM OF ABDOMEN: Status: ACTIVE | Noted: 2023-05-10

## 2023-05-10 PROBLEM — D22.61 MELANOCYTIC NEVI OF RIGHT UPPER LIMB, INCLUDING SHOULDER: Status: ACTIVE | Noted: 2023-05-10

## 2023-05-10 PROBLEM — D22.39 MELANOCYTIC NEVI OF OTHER PARTS OF FACE: Status: ACTIVE | Noted: 2023-05-10

## 2023-05-10 PROBLEM — D22.5 MELANOCYTIC NEVI OF TRUNK: Status: ACTIVE | Noted: 2023-05-10

## 2023-05-10 PROBLEM — D22.0 MELANOCYTIC NEVI OF LIP: Status: ACTIVE | Noted: 2023-05-10

## 2023-05-10 PROCEDURE — 99213 OFFICE O/P EST LOW 20 MIN: CPT

## 2023-05-10 PROCEDURE — ? SUNSCREEN RECOMMENDATIONS

## 2023-05-10 PROCEDURE — ? COUNSELING

## 2023-05-10 PROCEDURE — ? FULL BODY SKIN EXAM

## 2023-05-10 ASSESSMENT — LOCATION DETAILED DESCRIPTION DERM
LOCATION DETAILED: RIGHT LOWER CUTANEOUS LIP
LOCATION DETAILED: INFERIOR THORACIC SPINE
LOCATION DETAILED: LEFT POSTERIOR SHOULDER
LOCATION DETAILED: PERIUMBILICAL SKIN
LOCATION DETAILED: RIGHT POSTERIOR SHOULDER
LOCATION DETAILED: RIGHT DISTAL PRETIBIAL REGION
LOCATION DETAILED: LEFT PROXIMAL PRETIBIAL REGION
LOCATION DETAILED: RIGHT SUPERIOR MEDIAL LOWER BACK
LOCATION DETAILED: EPIGASTRIC SKIN
LOCATION DETAILED: LEFT SUPERIOR UPPER BACK
LOCATION DETAILED: RIGHT MEDIAL BUCCAL CHEEK

## 2023-05-10 ASSESSMENT — LOCATION SIMPLE DESCRIPTION DERM
LOCATION SIMPLE: UPPER BACK
LOCATION SIMPLE: LEFT SHOULDER
LOCATION SIMPLE: LEFT UPPER BACK
LOCATION SIMPLE: RIGHT LIP
LOCATION SIMPLE: RIGHT PRETIBIAL REGION
LOCATION SIMPLE: RIGHT SHOULDER
LOCATION SIMPLE: RIGHT CHEEK
LOCATION SIMPLE: ABDOMEN
LOCATION SIMPLE: RIGHT LOWER BACK
LOCATION SIMPLE: LEFT PRETIBIAL REGION

## 2023-05-10 ASSESSMENT — LOCATION ZONE DERM
LOCATION ZONE: LIP
LOCATION ZONE: TRUNK
LOCATION ZONE: ARM
LOCATION ZONE: LEG
LOCATION ZONE: FACE

## 2023-06-27 ENCOUNTER — HOSPITAL ENCOUNTER (OUTPATIENT)
Dept: RADIOLOGY | Age: 68
Discharge: HOME | End: 2023-06-27
Attending: OBSTETRICS & GYNECOLOGY
Payer: MEDICARE

## 2023-06-27 ENCOUNTER — TRANSCRIBE ORDERS (OUTPATIENT)
Dept: RADIOLOGY | Age: 68
End: 2023-06-27

## 2023-06-27 DIAGNOSIS — M81.8 OTHER OSTEOPOROSIS WITHOUT CURRENT PATHOLOGICAL FRACTURE: Primary | ICD-10-CM

## 2023-06-27 DIAGNOSIS — M81.8 OTHER OSTEOPOROSIS WITHOUT CURRENT PATHOLOGICAL FRACTURE: ICD-10-CM

## 2023-06-27 PROCEDURE — 77080 DXA BONE DENSITY AXIAL: CPT

## 2023-07-27 ENCOUNTER — TRANSCRIBE ORDERS (OUTPATIENT)
Dept: RADIOLOGY | Age: 68
End: 2023-07-27

## 2023-07-27 ENCOUNTER — HOSPITAL ENCOUNTER (OUTPATIENT)
Dept: RADIOLOGY | Age: 68
Discharge: HOME | End: 2023-07-27
Attending: OBSTETRICS & GYNECOLOGY
Payer: MEDICARE

## 2023-07-27 DIAGNOSIS — Z12.31 ENCOUNTER FOR SCREENING MAMMOGRAM FOR MALIGNANT NEOPLASM OF BREAST: ICD-10-CM

## 2023-07-27 DIAGNOSIS — Z12.31 ENCOUNTER FOR SCREENING MAMMOGRAM FOR MALIGNANT NEOPLASM OF BREAST: Primary | ICD-10-CM

## 2023-07-27 PROCEDURE — 77063 BREAST TOMOSYNTHESIS BI: CPT

## 2023-11-09 ENCOUNTER — OFFICE VISIT (OUTPATIENT)
Dept: FAMILY MEDICINE | Facility: CLINIC | Age: 68
End: 2023-11-09
Payer: MEDICARE

## 2023-11-09 VITALS
WEIGHT: 115 LBS | HEART RATE: 73 BPM | HEIGHT: 63 IN | BODY MASS INDEX: 20.38 KG/M2 | DIASTOLIC BLOOD PRESSURE: 78 MMHG | RESPIRATION RATE: 16 BRPM | SYSTOLIC BLOOD PRESSURE: 140 MMHG | OXYGEN SATURATION: 99 % | TEMPERATURE: 97.8 F

## 2023-11-09 DIAGNOSIS — Z13.220 SCREENING CHOLESTEROL LEVEL: ICD-10-CM

## 2023-11-09 DIAGNOSIS — Z00.00 MEDICARE ANNUAL WELLNESS VISIT, SUBSEQUENT: Primary | ICD-10-CM

## 2023-11-09 DIAGNOSIS — C85.10 LOW GRADE B-CELL LYMPHOMA (CMS/HCC): ICD-10-CM

## 2023-11-09 PROCEDURE — G0439 PPPS, SUBSEQ VISIT: HCPCS | Performed by: FAMILY MEDICINE

## 2023-11-09 ASSESSMENT — MINI COG
COMPLETED: YES
TOTAL SCORE: 5

## 2023-11-09 ASSESSMENT — PATIENT HEALTH QUESTIONNAIRE - PHQ9: SUM OF ALL RESPONSES TO PHQ9 QUESTIONS 1 & 2: 0

## 2023-11-09 NOTE — ASSESSMENT & PLAN NOTE
Written for cholesterol screening.  Up-to-date with colonoscopy.  Diabetes screening ordered.  Follows with ophthalmology routinely.  Declines influenza vaccination today.  Up to date with breast cancer screening.

## 2023-11-09 NOTE — PATIENT INSTRUCTIONS
Your Personalized Prevention Plan Services (PPPS)    Preventive Services Checklist (Assumes Average Risk Unless Otherwise Noted):    Health Maintenance Topics with due status: Overdue       Topic Date Due    Pneumococcal (65 years and older) Never done    Hepatitis C Screening Never done    DTaP, Tdap, and Td Vaccines Never done    Zoster Vaccine Never done    Influenza Vaccine Never done    COVID-19 Vaccine 09/01/2023    Medicare Annual Wellness Visit 11/09/2023    Depression Screening 11/09/2023    Falls Risk Screening 11/09/2023     Health Maintenance Topics with due status: Not Due       Topic Last Completion Date    Colorectal Cancer Screening 09/07/2021    DEXA Scan 06/27/2023    Breast Cancer Screening 07/27/2023     Health Maintenance Topics with due status: Aged Out       Topic Date Due    Meningococcal ACWY Aged Out    HIB Vaccines Aged Out    Hepatitis B Vaccines Aged Out    IPV Vaccines Aged Out    HPV Vaccines Aged Out       You May Be Eligible for These Additional Preventive Services   (Assumes Average Risk Unless Otherwise Noted)  Diabetes Screening Any 1 risk factor: hypertension, dyslipidemia, obesity, high glucose; or Any 2 risk factors: >=66yo, overweight, family history diabetes (covered every 6 months)   Hepatitis C Screening Any 1 risk factor: 1) blood transfusion before 1992,   2) current or past injection drug use (annually for high risk; if born between 7361-2419, see above for status).   Vaccine: Hepatitis B As necessary if at-risk: hemophilia, ESRD, diabetes, living with individual infected with hep B, healthcare worker with frequent contact with blood/bodily fluids (series covered once)   Sexually Transmitted Diseases (STDs) As necessary chlamydia, gonorrhea, syphilis, hepatitis B (covered annually)  HIV if any 1 risk factor present: 1) <14yo or >66yo and at increased risk or 2) 15-66yo and ask for it (covered annually)   Lung Cancer Screening Low dose chest CT if  all three risk factors: 1) 50-76yo, 2) smoker or quit within last 15y, 3) >=20 pack years (covered annually).  No results found for this or any previous visit.       Cholesterol Screening Both risk factors: 1) >=21yo and 2)  increased risk coronary artery disease (covered every 5 years).     Breast Cancer Screening Covered once 35-38yo, annually >=41yo (if >=49yo, see above for status).         Health Risk Factors with Personalized Education:  ----------------------------------------------------------------------------------------------------------------------  Controlling Your Blood Pressure  · Maintain a normal weight (body mass index between 18.5 and 24.9).  · Eat more fruit, vegetables and low-fat dairy.  · Eat less saturated fat and total fat.  · Lower your sodium (salt) intake.  Try to stay under 1500 mg per day, but if you cannot get your intake to be that low, at least lower it by 1000 mg.  · Stay active.  Try to get at least 90 to 150 minutes of exercise per week.  Try brisk walking, swimming, bicycling or dancing.  · Limit alcohol intake.  When you do consume alcohol, drink no more than 1 drink per day.  · If you have been prescribed medication, take it regularly and exactly as prescribed.  Let your PCP know if you have any problems or questions about your medication.  · Check your blood pressure at home or at the store.  Write down your readings and share them with your PCP  ---------------------------------------------------------------------------------------------------------------------   Controlling Your Osteoporosis, Strengthening Your Bones  · Try to get at least 90 to 150 minutes of weight-bearing exercise per week.  · Ensure intake of at least 1200mg of calcium per day.  Eat foods high in calcium like milk and other dairy, green vegetables, fruit, canned fish with soft and edible bones, nuts, calcium-set tofu.  Some foods are calcium-fortified, like bread, cereal, fruit juices and mineral  water.  · Help your body make vitamin D by getting 10-15 minutes per day of sunlight.    · Ensure intake of at least 600IU of vitamin D per day.  Eat foods high in vitamin D like oily fish (salmon, sardines, mackerel) and eggs.  Some foods are fortified with vitamin D, like dairy and cereals.  · Avoid high amounts of caffeine and salt, since they can cause the body to loose calcium.  · Limit alcohol intake, since it is associated with weaker bones and is associated with falls and fractures.  · Limit intake of fizzy drinks.  · If you have been prescribed medication, take it regularly and exactly as prescribed.  Let your PCP know if you have any problems or questions about your medication  ----------------------------------------------------------------------------------------------------------------------  Controlling Your Osteopenia, Strengthening Your Bones  · Try to get at least 90 to 150 minutes of weight-bearing exercise per week.  · Ensure intake of at least 1200mg of calcium per day.  Eat foods high in calcium like milk and other dairy, green vegetables, fruit, canned fish with soft and edible bones, nuts, calcium-set tofu.  Some foods are calcium-fortified, like bread, cereal, fruit juices and mineral water.  · Help your body make vitamin D by getting 10-15 minutes per day of sunlight.    · Ensure intake of at least 600IU of vitamin D per day.  Eat foods high in vitamin D like oily fish (salmon, sardines, mackerel) and eggs.  Some foods are fortified with vitamin D, like dairy and cereals.  · Avoid high amounts of caffeine and salt, since they can cause the body to loose calcium.  · Limit alcohol intake, since it is associated with weaker bones and is associated with falls and fractures.  · Limit intake of fizzy drinks.  · If you have been prescribed medication, take it regularly and exactly as prescribed.  Let your PCP know if you have any problems or questions about your  medication  ----------------------------------------------------------------------------------------------------------------------  Reducing Your Risk of Falls  · Tell your PCP if any of your medications make you feel tired, dizzy, lightheaded or off-balance.  · Maintain coordination, flexibility and balance by ensuring regular physical activity.  · Limit alcohol intake to 1 drink per day.  Consider avoiding all alcohol intake.  · Ensure good vision.  Visit an ophthalmologist or optometrist regularly for vision screening or to make sure your glasses / contact lens prescription is correct.  If you need glasses or contacts, wear them.  When you get new glasses or contacts, take time to get used to them.  Do not wear sunglasses or tinted lenses when indoors.  · Ensure good hearing.  Have your hearing checked if you are having trouble hearing, or family and friends think you cannot hear them.  If you need a hearing aid, be sure it fits well and wear it.  · Get enough rest.  Ensure about 7-9 hours of sleep every day.  · Get up slowly from your bed or chairs.  Do not start walking until you are sure you feel steady.  · Wear non-skid, rubber-soled, low-heeled shoes.  Do not walk in socks, or in shoes and slippers with smooth soles.  · If your PCP or therapist recommends using a cane or walker, use it regularly.  · Make your home safer.  Increase lighting throughout the house, especially at the top and bottom of stairs.  Ensure lighting is easily turned on when getting up in the middle of the night.  Make sure there are two secure rails on all stairs.  Install grab bars in the bathtub / shower and near the toilet.  Consider using a shower chair and / or a hand-held shower.  · Spread sand or salt on icy surfaces.  Beware of wet surfaces, which can be icy.  · Tell your PCP if you have fallen.

## 2023-11-09 NOTE — PROGRESS NOTES
"  Subjective     Debra Miranda is a 68 y.o. female who presents for a subsequent annual wellness visit.     Patient Care Team:  Yash Pop MD as PCP - General (Family Medicine)  Bridget Higginbotham MD as Obstetrician (Obstetrics and Gynecology)          Subjective      Patient ID: Debra Miranda is a 68 y.o. female.  1955      Patient reports office for Medicare annual wellness visit.    Patient has been doing well recently without complaint.  Patient exercises regularly and is able to perform ADLs around the house without issue.    Patient keeps routine follow-up with gynecology for breast cancer screening and osteoporosis screening.    Patient follows annually with [facility] for surveillance of B-cell lymphoma.      The following have been reviewed and updated as appropriate in this visit:   Tobacco  Allergies  Meds  Problems  Med Hx  Surg Hx  Fam Hx       Review of Systems   All other systems reviewed and are negative.      Objective     Vitals:    11/09/23 0934   BP: 140/78   Pulse: 73   Resp: 16   Temp: 36.6 °C (97.8 °F)   TempSrc: Temporal   SpO2: 99%   Weight: 52.2 kg (115 lb)   Height: 1.6 m (5' 3\")     Body mass index is 20.37 kg/m².    Physical Exam  Vitals and nursing note reviewed.   Constitutional:       Appearance: Normal appearance. She is well-developed.   HENT:      Right Ear: Tympanic membrane and ear canal normal.      Left Ear: Tympanic membrane and ear canal normal.      Nose: Nose normal.      Mouth/Throat:      Mouth: Mucous membranes are moist.      Pharynx: Oropharynx is clear.   Eyes:      Conjunctiva/sclera: Conjunctivae normal.   Cardiovascular:      Rate and Rhythm: Normal rate and regular rhythm.   Pulmonary:      Effort: Pulmonary effort is normal.      Breath sounds: Normal breath sounds.   Abdominal:      Palpations: Abdomen is soft. There is no mass.      Tenderness: There is no abdominal tenderness.   Musculoskeletal:         General: Normal " range of motion.      Cervical back: Normal range of motion and neck supple.   Skin:     General: Skin is warm and dry.   Neurological:      General: No focal deficit present.      Mental Status: She is alert. Mental status is at baseline.   Psychiatric:         Mood and Affect: Mood normal.           Comprehensive Medical and Social History  Patient Active Problem List   Diagnosis    Headache due to intracranial disease    Allergic rhinitis    Atrophic vaginitis    Brain mass    Low grade B-cell lymphoma (CMS/HCC)    Osteopenia    Medicare annual wellness visit, subsequent     Past Medical History:   Diagnosis Date    Asthma     Brain tumor (benign) (CMS/HCC)     Fibromyalgia     Headache     Lymphoma, small follicular center-cell (CMS/HCC)     Osteopenia     Other chronic sinusitis 1/16/2012    Overview:      Past Surgical History:   Procedure Laterality Date    BONE MARROW BIOPSY      CRANIECTOMY / CRANIOTOMY FOR EXCISION OF BRAIN TUMOR      SINUS SURGERY       Allergies   Allergen Reactions    Sudafed [Pseudoephedrine Hcl] Hives    Wheat GI intolerance     SENSITIVE      Current Outpatient Medications   Medication Sig Dispense Refill    calcium carbonate-vitamin D3 600 mg-10 mcg (400 unit) capsule Take by mouth.      FISH OIL-omega-3 fatty acids (FISH OIL) 340-1,000 mg capsule Take by mouth 2 (two) times a day.      magnesium oxide 200 mg magnesium tablet Take by mouth.       No current facility-administered medications for this visit.     Social History     Tobacco Use    Smoking status: Never    Smokeless tobacco: Never   Substance Use Topics    Alcohol use: No    Drug use: No     Family History   Problem Relation Age of Onset    Heart failure Biological Mother     Melanoma Biological Father        Objective   Vitals  Vitals:    11/09/23 0934   BP: 140/78   Pulse: 73   Resp: 16   Temp: 36.6 °C (97.8 °F)   TempSrc: Temporal   SpO2: 99%   Weight: 52.2 kg (115 lb)   Height: 1.6 m (5'  "3\")     Body mass index is 20.37 kg/m².    Advanced Care Plan  Does patient have advance directive?: Yes           Does patient have current OOH DNR form?: Yes           Does patient have current POLST?: Yes             PHQ  Will the patient answer the depression questions?: Yes   Little interest or pleasure in doing things: Not at all   Feeling down, depressed, or hopeless: Not at all   Depression Risk: 0                                             Mini Cog  Completed: Yes  Score: 5  Result: Negative      Get Up and Go  Result: Pass    STEADI Falls Risk  One or more falls in the last year: No           Has trouble stepping up onto a curb: No   Advised to use a cane or walker to get around safely: No   Often has to rush to the toilet: No   Feels unsteady when walking: No   Has lost some feeling in feet: No   Often feels sad or depressed: No   Steadies self on furniture while walking at home: No   Takes medication that makes him/her feel lightheaded or more tired than usual: No   Worried about falling: No   Takes medicine to sleep or improve mood: No   Needs to push with hands when rising from a chair: No   Falls screen completed: Yes     Hearing and Vision Screening  No results found.  See HRA for relevant hearing screening response.    Diet and Exercise            Assessment/Plan   Diagnoses and all orders for this visit:    Medicare annual wellness visit, subsequent (Primary)  Assessment & Plan:  Written for cholesterol screening.  Up-to-date with colonoscopy.  Diabetes screening ordered.  Follows with ophthalmology routinely.  Declines influenza vaccination today.  Up to date with breast cancer screening.      Screening cholesterol level  -     Comprehensive metabolic panel; Future  -     CBC and Differential; Future  -     Lipid Prof w Refl; Future    Low grade B-cell lymphoma (CMS/HCC)  Assessment & Plan:  Follows up annually with [facility].        See Patient Instructions (the written plan) which was " given to the patient for PPPS and health risk factors with interventions.

## 2023-11-17 LAB
ALBUMIN SERPL-MCNC: 4.2 G/DL (ref 3.9–4.9)
ALBUMIN/GLOB SERPL: 2.2 {RATIO} (ref 1.2–2.2)
ALP SERPL-CCNC: 73 IU/L (ref 44–121)
ALT SERPL-CCNC: 29 IU/L (ref 0–32)
AST SERPL-CCNC: 36 IU/L (ref 0–40)
BASOPHILS # BLD AUTO: 0.1 X10E3/UL (ref 0–0.2)
BASOPHILS NFR BLD AUTO: 1 %
BILIRUB SERPL-MCNC: 0.4 MG/DL (ref 0–1.2)
BUN SERPL-MCNC: 19 MG/DL (ref 8–27)
BUN/CREAT SERPL: 22 (ref 12–28)
CALCIUM SERPL-MCNC: 9.3 MG/DL (ref 8.7–10.3)
CHLORIDE SERPL-SCNC: 100 MMOL/L (ref 96–106)
CHOLEST SERPL-MCNC: 176 MG/DL (ref 100–199)
CO2 SERPL-SCNC: 27 MMOL/L (ref 20–29)
CREAT SERPL-MCNC: 0.88 MG/DL (ref 0.57–1)
EGFRCR SERPLBLD CKD-EPI 2021: 71 ML/MIN/1.73
EOSINOPHIL # BLD AUTO: 0 X10E3/UL (ref 0–0.4)
EOSINOPHIL NFR BLD AUTO: 1 %
ERYTHROCYTE [DISTWIDTH] IN BLOOD BY AUTOMATED COUNT: 12 % (ref 11.7–15.4)
GLOBULIN SER CALC-MCNC: 1.9 G/DL (ref 1.5–4.5)
GLUCOSE SERPL-MCNC: 91 MG/DL (ref 70–99)
HCT VFR BLD AUTO: 41.3 % (ref 34–46.6)
HDLC SERPL-MCNC: 81 MG/DL
HGB BLD-MCNC: 13.9 G/DL (ref 11.1–15.9)
IMM GRANULOCYTES # BLD AUTO: 0 X10E3/UL (ref 0–0.1)
IMM GRANULOCYTES NFR BLD AUTO: 0 %
LDLC SERPL CALC-MCNC: 79 MG/DL (ref 0–99)
LDLC/HDLC SERPL: 1 RATIO (ref 0–3.2)
LYMPHOCYTES # BLD AUTO: 0.8 X10E3/UL (ref 0.7–3.1)
LYMPHOCYTES NFR BLD AUTO: 20 %
MCH RBC QN AUTO: 30.9 PG (ref 26.6–33)
MCHC RBC AUTO-ENTMCNC: 33.7 G/DL (ref 31.5–35.7)
MCV RBC AUTO: 92 FL (ref 79–97)
MONOCYTES # BLD AUTO: 0.4 X10E3/UL (ref 0.1–0.9)
MONOCYTES NFR BLD AUTO: 9 %
NEUTROPHILS # BLD AUTO: 2.9 X10E3/UL (ref 1.4–7)
NEUTROPHILS NFR BLD AUTO: 69 %
PLATELET # BLD AUTO: 202 X10E3/UL (ref 150–450)
POTASSIUM SERPL-SCNC: 4.5 MMOL/L (ref 3.5–5.2)
PROT SERPL-MCNC: 6.1 G/DL (ref 6–8.5)
RBC # BLD AUTO: 4.5 X10E6/UL (ref 3.77–5.28)
SODIUM SERPL-SCNC: 139 MMOL/L (ref 134–144)
TRIGL SERPL-MCNC: 88 MG/DL (ref 0–149)
VLDLC SERPL CALC-MCNC: 16 MG/DL (ref 5–40)
WBC # BLD AUTO: 4.3 X10E3/UL (ref 3.4–10.8)

## 2024-06-12 ENCOUNTER — TRANSCRIBE ORDERS (OUTPATIENT)
Dept: SCHEDULING | Age: 69
End: 2024-06-12

## 2024-06-12 DIAGNOSIS — C85.11 UNSPECIFIED B-CELL LYMPHOMA, LYMPH NODES OF HEAD, FACE, AND NECK (CMS/HCC): Primary | ICD-10-CM

## 2024-06-24 ENCOUNTER — APPOINTMENT (RX ONLY)
Dept: URBAN - METROPOLITAN AREA CLINIC 374 | Facility: CLINIC | Age: 69
Setting detail: DERMATOLOGY
End: 2024-06-24

## 2024-06-24 DIAGNOSIS — D18.0 HEMANGIOMA: ICD-10-CM | Status: UNCHANGED

## 2024-06-24 DIAGNOSIS — L91.8 OTHER HYPERTROPHIC DISORDERS OF THE SKIN: ICD-10-CM | Status: UNCHANGED

## 2024-06-24 DIAGNOSIS — L81.5 LEUKODERMA, NOT ELSEWHERE CLASSIFIED: ICD-10-CM | Status: UNCHANGED

## 2024-06-24 DIAGNOSIS — L82.1 OTHER SEBORRHEIC KERATOSIS: ICD-10-CM | Status: UNCHANGED

## 2024-06-24 DIAGNOSIS — Z71.89 OTHER SPECIFIED COUNSELING: ICD-10-CM

## 2024-06-24 DIAGNOSIS — D22 MELANOCYTIC NEVI: ICD-10-CM | Status: UNCHANGED

## 2024-06-24 DIAGNOSIS — L81.4 OTHER MELANIN HYPERPIGMENTATION: ICD-10-CM | Status: UNCHANGED

## 2024-06-24 DIAGNOSIS — L57.0 ACTINIC KERATOSIS: ICD-10-CM | Status: INADEQUATELY CONTROLLED

## 2024-06-24 PROBLEM — D22.5 MELANOCYTIC NEVI OF TRUNK: Status: ACTIVE | Noted: 2024-06-24

## 2024-06-24 PROBLEM — D18.01 HEMANGIOMA OF SKIN AND SUBCUTANEOUS TISSUE: Status: ACTIVE | Noted: 2024-06-24

## 2024-06-24 PROCEDURE — 17000 DESTRUCT PREMALG LESION: CPT

## 2024-06-24 PROCEDURE — ? FULL BODY SKIN EXAM

## 2024-06-24 PROCEDURE — 17003 DESTRUCT PREMALG LES 2-14: CPT

## 2024-06-24 PROCEDURE — ? COUNSELING

## 2024-06-24 PROCEDURE — 99213 OFFICE O/P EST LOW 20 MIN: CPT | Mod: 25

## 2024-06-24 PROCEDURE — ? LIQUID NITROGEN

## 2024-06-24 ASSESSMENT — LOCATION SIMPLE DESCRIPTION DERM
LOCATION SIMPLE: RIGHT AXILLARY VAULT
LOCATION SIMPLE: LEFT ANTERIOR NECK
LOCATION SIMPLE: RIGHT HAND
LOCATION SIMPLE: RIGHT UPPER BACK
LOCATION SIMPLE: LEFT PRETIBIAL REGION
LOCATION SIMPLE: LEFT FOREARM
LOCATION SIMPLE: RIGHT FOREARM
LOCATION SIMPLE: ABDOMEN
LOCATION SIMPLE: LEFT CHEEK
LOCATION SIMPLE: RIGHT ANTERIOR NECK
LOCATION SIMPLE: UPPER BACK
LOCATION SIMPLE: RIGHT PRETIBIAL REGION
LOCATION SIMPLE: LEFT HAND
LOCATION SIMPLE: LEFT BREAST
LOCATION SIMPLE: LEFT AXILLARY VAULT
LOCATION SIMPLE: LEFT EAR

## 2024-06-24 ASSESSMENT — LOCATION ZONE DERM
LOCATION ZONE: FACE
LOCATION ZONE: LEG
LOCATION ZONE: HAND
LOCATION ZONE: TRUNK
LOCATION ZONE: ARM
LOCATION ZONE: AXILLAE
LOCATION ZONE: EAR
LOCATION ZONE: NECK

## 2024-06-24 ASSESSMENT — LOCATION DETAILED DESCRIPTION DERM
LOCATION DETAILED: RIGHT RADIAL DORSAL HAND
LOCATION DETAILED: LEFT RADIAL DORSAL HAND
LOCATION DETAILED: LEFT DISTAL DORSAL FOREARM
LOCATION DETAILED: LEFT MEDIAL BREAST 10-11:00 REGION
LOCATION DETAILED: RIGHT AXILLARY VAULT
LOCATION DETAILED: RIGHT INFERIOR LATERAL NECK
LOCATION DETAILED: RIGHT DISTAL DORSAL FOREARM
LOCATION DETAILED: LEFT AXILLARY VAULT
LOCATION DETAILED: EPIGASTRIC SKIN
LOCATION DETAILED: LEFT INFERIOR MEDIAL MALAR CHEEK
LOCATION DETAILED: LEFT PROXIMAL PRETIBIAL REGION
LOCATION DETAILED: RIGHT MEDIAL UPPER BACK
LOCATION DETAILED: SUPERIOR THORACIC SPINE
LOCATION DETAILED: LEFT ANTIHELIX
LOCATION DETAILED: INFERIOR THORACIC SPINE
LOCATION DETAILED: LEFT INFERIOR LATERAL NECK
LOCATION DETAILED: RIGHT PROXIMAL PRETIBIAL REGION

## 2024-06-24 NOTE — PROCEDURE: LIQUID NITROGEN
Render Post-Care Instructions In Note?: yes
Number Of Freeze-Thaw Cycles: 2 freeze-thaw cycles
Render Note In Bullet Format When Appropriate: No
Application Tool (Optional): Liquid Nitrogen Sprayer
Post-Care Instructions: I reviewed with the patient in detail post-care instructions, including but not only, to wash daily soap and water and to apply white petrolatum (Vaseline) to affected area. In addition, photoprotection, including consistent use of sunscreen SPF>30/broad spectrum every 2 hours.
Detail Level: Detailed
Consent: The patient's consent was obtained including but not limited to risks of crusting, scabbing, blistering, scarring, darker or lighter pigmentary change, recurrence, incomplete removal and infection.
Duration Of Freeze Thaw-Cycle (Seconds): 15

## 2024-07-09 ENCOUNTER — HOSPITAL ENCOUNTER (OUTPATIENT)
Dept: RADIOLOGY | Facility: HOSPITAL | Age: 69
Discharge: HOME | End: 2024-07-09
Attending: NURSE PRACTITIONER
Payer: MEDICARE

## 2024-07-09 DIAGNOSIS — C85.11 UNSPECIFIED B-CELL LYMPHOMA, LYMPH NODES OF HEAD, FACE, AND NECK (CMS/HCC): ICD-10-CM

## 2024-07-09 RX ORDER — GADOBUTROL 604.72 MG/ML
5.2 INJECTION INTRAVENOUS ONCE
Status: COMPLETED | OUTPATIENT
Start: 2024-07-09 | End: 2024-07-09

## 2024-07-09 RX ADMIN — GADOBUTROL 5.2 ML: 604.72 INJECTION INTRAVENOUS at 16:33

## 2024-07-31 ENCOUNTER — TRANSCRIBE ORDERS (OUTPATIENT)
Dept: RADIOLOGY | Age: 69
End: 2024-07-31

## 2024-07-31 ENCOUNTER — HOSPITAL ENCOUNTER (OUTPATIENT)
Dept: RADIOLOGY | Age: 69
Discharge: HOME | End: 2024-07-31
Attending: OBSTETRICS & GYNECOLOGY
Payer: MEDICARE

## 2024-07-31 DIAGNOSIS — Z12.31 ENCOUNTER FOR SCREENING MAMMOGRAM FOR MALIGNANT NEOPLASM OF BREAST: Primary | ICD-10-CM

## 2024-07-31 DIAGNOSIS — Z12.31 ENCOUNTER FOR SCREENING MAMMOGRAM FOR MALIGNANT NEOPLASM OF BREAST: ICD-10-CM

## 2024-07-31 PROCEDURE — 77067 SCR MAMMO BI INCL CAD: CPT

## 2024-07-31 PROCEDURE — 77063 BREAST TOMOSYNTHESIS BI: CPT

## 2024-11-11 ENCOUNTER — TELEPHONE (OUTPATIENT)
Dept: FAMILY MEDICINE | Facility: CLINIC | Age: 69
End: 2024-11-11

## 2024-11-11 ENCOUNTER — OFFICE VISIT (OUTPATIENT)
Dept: FAMILY MEDICINE | Facility: CLINIC | Age: 69
End: 2024-11-11
Payer: MEDICARE

## 2024-11-11 VITALS
OXYGEN SATURATION: 98 % | HEART RATE: 62 BPM | DIASTOLIC BLOOD PRESSURE: 92 MMHG | HEIGHT: 63 IN | SYSTOLIC BLOOD PRESSURE: 145 MMHG | TEMPERATURE: 97.3 F | RESPIRATION RATE: 16 BRPM | BODY MASS INDEX: 20.38 KG/M2 | WEIGHT: 115 LBS

## 2024-11-11 DIAGNOSIS — Z13.220 SCREENING CHOLESTEROL LEVEL: Primary | ICD-10-CM

## 2024-11-11 DIAGNOSIS — Z00.00 MEDICARE ANNUAL WELLNESS VISIT, SUBSEQUENT: Primary | ICD-10-CM

## 2024-11-11 DIAGNOSIS — Z00.00 MEDICARE ANNUAL WELLNESS VISIT, SUBSEQUENT: ICD-10-CM

## 2024-11-11 DIAGNOSIS — C85.10 LOW GRADE B-CELL LYMPHOMA (CMS/HCC): ICD-10-CM

## 2024-11-11 DIAGNOSIS — Z13.220 SCREENING CHOLESTEROL LEVEL: ICD-10-CM

## 2024-11-11 PROBLEM — G93.89 BRAIN MASS: Status: RESOLVED | Noted: 2019-02-09 | Resolved: 2024-11-11

## 2024-11-11 PROCEDURE — G0439 PPPS, SUBSEQ VISIT: HCPCS | Performed by: FAMILY MEDICINE

## 2024-11-11 ASSESSMENT — PATIENT HEALTH QUESTIONNAIRE - PHQ9: SUM OF ALL RESPONSES TO PHQ9 QUESTIONS 1 & 2: 0

## 2024-11-11 ASSESSMENT — MINI COG
TOTAL SCORE: 5
COMPLETED: YES

## 2024-11-11 NOTE — ASSESSMENT & PLAN NOTE
Cholesterol screening ordered.  Up-to-date with colonoscopy.  Diabetes screening ordered.  Follows with ophthalmology.  Declines influenza vaccination.  Mammogram up-to-date.    DEXA performed last year.

## 2024-11-11 NOTE — PROGRESS NOTES
"Subjective     Debra Miranda is a 69 y.o. female who presents for a subsequent annual wellness visit.     Patient Care Team:  Yash Pop MD as PCP - General (Family Medicine)  Bridget Higginbotham MD as Obstetrician (Obstetrics and Gynecology)    Subjective      Patient ID: Debra Miranda is a 69 y.o. female.  1955      Patient reports office for Medicare annual wellness visit.    Patient overall doing well recently.  Continues to follow with oncology at [facility] and had recent MRI of the brain that she has no recurrence of her B-cell lymphoma.  Patient eats healthy and stays active.  Up-to-date with screening colonoscopy and due for follow-up next year.  Up-to-date with screening mammogram.  Performs ADLs without issue and lives at home with .        The following have been reviewed and updated as appropriate in this visit:   Tobacco  Allergies  Meds  Problems  Med Hx  Surg Hx  Fam Hx       Review of Systems   All other systems reviewed and are negative.      Objective     Vitals:    11/11/24 0923 11/11/24 0943   BP: (!) 162/88 (!) 145/92   BP Location: Right upper arm    Patient Position: Sitting    Pulse: 62    Resp: 16    Temp: 36.3 °C (97.3 °F)    TempSrc: Temporal    SpO2: 98%    Weight: 52.2 kg (115 lb)    Height: 1.6 m (5' 3\")      Body mass index is 20.37 kg/m².    Physical Exam  Vitals and nursing note reviewed.   Constitutional:       Appearance: Normal appearance. She is well-developed.   HENT:      Right Ear: Tympanic membrane and ear canal normal.      Left Ear: Tympanic membrane and ear canal normal.      Nose: Nose normal.      Mouth/Throat:      Mouth: Mucous membranes are moist.      Pharynx: Oropharynx is clear.   Eyes:      Conjunctiva/sclera: Conjunctivae normal.   Neck:      Vascular: No carotid bruit.   Cardiovascular:      Rate and Rhythm: Normal rate and regular rhythm.   Pulmonary:      Effort: Pulmonary effort is normal.      Breath sounds: Normal breath " sounds.   Abdominal:      General: Bowel sounds are normal.      Palpations: Abdomen is soft. There is no mass.      Tenderness: There is no abdominal tenderness.   Musculoskeletal:         General: Normal range of motion.      Cervical back: Normal range of motion and neck supple.   Skin:     General: Skin is warm and dry.   Neurological:      General: No focal deficit present.      Mental Status: She is alert. Mental status is at baseline.   Psychiatric:         Mood and Affect: Mood normal.         Comprehensive Medical and Social History  Patient Active Problem List   Diagnosis    Headache due to intracranial disease    Allergic rhinitis    Atrophic vaginitis    Low grade B-cell lymphoma (CMS/HCC)    Osteopenia    Medicare annual wellness visit, subsequent     Past Medical History:   Diagnosis Date    Asthma     Brain mass 02/09/2019    Overview:   Added automatically from request for surgery [id number]      Brain tumor (benign) (CMS/HCC)     Fibromyalgia     Headache     Lymphoma, small follicular center-cell (CMS/HCC)     Osteopenia     Other chronic sinusitis 01/16/2012    Overview:      Past Surgical History   Procedure Laterality Date    Bone marrow biopsy      Craniectomy / craniotomy for excision of brain tumor      Sinus surgery       Allergies   Allergen Reactions    Sudafed [Pseudoephedrine Hcl] Hives    Wheat GI intolerance     SENSITIVE      Current Outpatient Medications   Medication Sig Dispense Refill    calcium carbonate-vitamin D3 600 mg-10 mcg (400 unit) capsule Take by mouth.      FISH OIL-omega-3 fatty acids (FISH OIL) 340-1,000 mg capsule Take by mouth 2 (two) times a day.      magnesium oxide 200 mg magnesium tablet Take by mouth.      psyllium seed, with sugar, (FIBER ORAL) Take by mouth.       No current facility-administered medications for this visit.     Social History     Tobacco Use    Smoking status: Never    Smokeless tobacco: Never   Substance Use Topics    Alcohol use: No    Drug  "use: No     Family History   Problem Relation Name Age of Onset    Heart failure Biological Mother      Melanoma Biological Father         Objective   Vitals  Vitals:    11/11/24 0923 11/11/24 0943   BP: (!) 162/88 (!) 145/92   BP Location: Right upper arm    Patient Position: Sitting    Pulse: 62    Resp: 16    Temp: 36.3 °C (97.3 °F)    TempSrc: Temporal    SpO2: 98%    Weight: 52.2 kg (115 lb)    Height: 1.6 m (5' 3\")      Body mass index is 20.37 kg/m².    Advanced Care Plan  Does patient have advance directive?: Yes           Does patient have current OOH DNR form?: No                         PHQ  Will the patient answer the depression questions?: Yes   Little interest or pleasure in doing things: Not at all   Feeling down, depressed, or hopeless: Not at all   Depression Risk: 0                                             Mini Cog  Completed: Yes  Score: 5  Result: Negative      Get Up and Go  Result: Pass    STEADI Falls Risk  One or more falls in the last year: No           Has trouble stepping up onto a curb: No   Advised to use a cane or walker to get around safely: No   Often has to rush to the toilet: No   Feels unsteady when walking: No   Has lost some feeling in feet: No   Often feels sad or depressed: No   Steadies self on furniture while walking at home: No   Takes medication that makes him/her feel lightheaded or more tired than usual: No   Worried about falling: No   Takes medicine to sleep or improve mood: No   Needs to push with hands when rising from a chair: No   Falls screen completed: Yes     Hearing and Vision Screening  No results found.  See HRA for relevant hearing screening response.    Diet and Exercise            Assessment/Plan   Diagnoses and all orders for this visit:    Medicare annual wellness visit, subsequent (Primary)  Assessment & Plan:  Cholesterol screening ordered.  Up-to-date with colonoscopy.  Diabetes screening ordered.  Follows with ophthalmology.  Declines influenza " vaccination.  Mammogram up-to-date.    DEXA performed last year.      Low grade B-cell lymphoma (CMS/HCC)  Assessment & Plan:  Continues to follow with [facility] for surveillance.      Screening cholesterol level  -     Comprehensive metabolic panel; Future  -     CBC and Differential; Future  -     LIPID PROFILE W/REFLEX TO DIRECT LDL; Future      Blood pressure improved upon recheck.  Patient will check her blood pressures with her home cuff and let us know if they are above 140/90.      See Patient Instructions (the written plan) which was given to the patient for PPPS and health risk factors with interventions.

## 2024-11-11 NOTE — TELEPHONE ENCOUNTER
Patient is asking for the lab orders from today to be changed to [facility]. Pls advise.

## 2024-11-11 NOTE — PATIENT INSTRUCTIONS
Your Personalized Prevention Plan Services (PPPS)    Preventive Services Checklist (Assumes Average Risk Unless Otherwise Noted):    Health Maintenance Topics with due status: Overdue       Topic Date Due    Pneumococcal (65 years and older) Never done    Hepatitis C Screening Never done    DTaP, Tdap, and Td Vaccines Never done    Zoster Vaccine Never done    RSV Vaccine Never done    Influenza Vaccine Never done    COVID-19 Vaccine 09/01/2024    Medicare Annual Wellness Visit 11/09/2024     Health Maintenance Topics with due status: Not Due       Topic Last Completion Date    Colorectal Cancer Screening 09/07/2021    DEXA Scan 06/27/2023    Breast Cancer Screening 07/31/2024    Depression Screening 11/11/2024    Falls Risk Screening 11/11/2024     Health Maintenance Topics with due status: Aged Out       Topic Date Due    Meningococcal ACWY Aged Out    RSV <20 months Aged Out    HIB Vaccines Aged Out    Hepatitis B Vaccines Aged Out    IPV Vaccines Aged Out    HPV Vaccines Aged Out       You May Be Eligible for These Additional Preventive Services   (Assumes Average Risk Unless Otherwise Noted)  Diabetes Screening Any 1 risk factor: hypertension, dyslipidemia, obesity, high glucose; or Any 2 risk factors: >=66yo, overweight, family history diabetes (covered every 6 months)   Hepatitis C Screening Any 1 risk factor: 1) blood transfusion before 1992,   2) current or past injection drug use (annually for high risk; if born between 1915-3351, see above for status).   Vaccine: Hepatitis B As necessary if at-risk: hemophilia, ESRD, diabetes, living with individual infected with hep B, healthcare worker with frequent contact with blood/bodily fluids (series covered once)   Sexually Transmitted Diseases (STDs) As necessary chlamydia, gonorrhea, syphilis, hepatitis B (covered annually)  HIV if any 1 risk factor present: 1) <14yo or >66yo and at increased risk or 2) 15-66yo and ask for it (covered  annually)   Lung Cancer Screening Low dose chest CT if all three risk factors: 1) 50-76yo, 2) smoker or quit within last 15y, 3) >=20 pack years (covered annually).  No results found for this or any previous visit.       Cholesterol Screening Both risk factors: 1) >=21yo and 2)  increased risk coronary artery disease (covered every 5 years).   Breast Cancer Screening Covered once 35-40yo, annually >=39yo (if >=51yo, see above for status).       Health Risk Factors with Personalized Education:  ----------------------------------------------------------------------------------------------------------------------  Controlling Your Blood Pressure  Maintain a normal weight (body mass index between 18.5 and 24.9).  Eat more fruit, vegetables and low-fat dairy.  Eat less saturated fat and total fat.  Lower your sodium (salt) intake.  Try to stay under 1500 mg per day, but if you cannot get your intake to be that low, at least lower it by 1000 mg.  Stay active.  Try to get at least 90 to 150 minutes of exercise per week.  Try brisk walking, swimming, bicycling or dancing.  Limit alcohol intake.  When you do consume alcohol, drink no more than 1 drink per day.  If you have been prescribed medication, take it regularly and exactly as prescribed.  Let your PCP know if you have any problems or questions about your medication.  Check your blood pressure at home or at the store.  Write down your readings and share them with your PCP  ---------------------------------------------------------------------------------------------------------------------   Controlling Your Osteoporosis, Strengthening Your Bones  Try to get at least 90 to 150 minutes of weight-bearing exercise per week.  Ensure intake of at least 1200mg of calcium per day.  Eat foods high in calcium like milk and other dairy, green vegetables, fruit, canned fish with soft and edible bones, nuts, calcium-set tofu.  Some foods are calcium-fortified, like bread, cereal,  fruit juices and mineral water.  Help your body make vitamin D by getting 10-15 minutes per day of sunlight.    Ensure intake of at least 600IU of vitamin D per day.  Eat foods high in vitamin D like oily fish (salmon, sardines, mackerel) and eggs.  Some foods are fortified with vitamin D, like dairy and cereals.  Avoid high amounts of caffeine and salt, since they can cause the body to loose calcium.  Limit alcohol intake, since it is associated with weaker bones and is associated with falls and fractures.  Limit intake of fizzy drinks.  If you have been prescribed medication, take it regularly and exactly as prescribed.  Let your PCP know if you have any problems or questions about your medication  ----------------------------------------------------------------------------------------------------------------------  Controlling Your Osteopenia, Strengthening Your Bones  Try to get at least 90 to 150 minutes of weight-bearing exercise per week.  Ensure intake of at least 1200mg of calcium per day.  Eat foods high in calcium like milk and other dairy, green vegetables, fruit, canned fish with soft and edible bones, nuts, calcium-set tofu.  Some foods are calcium-fortified, like bread, cereal, fruit juices and mineral water.  Help your body make vitamin D by getting 10-15 minutes per day of sunlight.    Ensure intake of at least 600IU of vitamin D per day.  Eat foods high in vitamin D like oily fish (salmon, sardines, mackerel) and eggs.  Some foods are fortified with vitamin D, like dairy and cereals.  Avoid high amounts of caffeine and salt, since they can cause the body to loose calcium.  Limit alcohol intake, since it is associated with weaker bones and is associated with falls and fractures.  Limit intake of fizzy drinks.  If you have been prescribed medication, take it regularly and exactly as prescribed.  Let your PCP know if you have any problems or questions about your  medication  ----------------------------------------------------------------------------------------------------------------------  Reducing Your Risk of Falls  Tell your PCP if any of your medications make you feel tired, dizzy, lightheaded or off-balance.  Maintain coordination, flexibility and balance by ensuring regular physical activity.  Limit alcohol intake to 1 drink per day.  Consider avoiding all alcohol intake.  Ensure good vision.  Visit an ophthalmologist or optometrist regularly for vision screening or to make sure your glasses / contact lens prescription is correct.  If you need glasses or contacts, wear them.  When you get new glasses or contacts, take time to get used to them.  Do not wear sunglasses or tinted lenses when indoors.  Ensure good hearing.  Have your hearing checked if you are having trouble hearing, or family and friends think you cannot hear them.  If you need a hearing aid, be sure it fits well and wear it.  Get enough rest.  Ensure about 7-9 hours of sleep every day.  Get up slowly from your bed or chairs.  Do not start walking until you are sure you feel steady.  Wear non-skid, rubber-soled, low-heeled shoes.  Do not walk in socks, or in shoes and slippers with smooth soles.  If your PCP or therapist recommends using a cane or walker, use it regularly.  Make your home safer.  Increase lighting throughout the house, especially at the top and bottom of stairs.  Ensure lighting is easily turned on when getting up in the middle of the night.  Make sure there are two secure rails on all stairs.  Install grab bars in the bathtub / shower and near the toilet.  Consider using a shower chair and / or a hand-held shower.  Spread sand or salt on icy surfaces.  Beware of wet surfaces, which can be icy.  Tell your PCP if you have fallen.

## 2024-11-18 ENCOUNTER — TELEPHONE (OUTPATIENT)
Dept: FAMILY MEDICINE | Facility: CLINIC | Age: 69
End: 2024-11-18
Payer: MEDICARE

## 2024-11-18 NOTE — TELEPHONE ENCOUNTER
Patient asking if her lab tio labs could be changed to [facility] labs. Lab tio doesn't have anything until Dec. She is Medicare and can go anywhere.     Her number  once completed.

## 2024-11-20 ENCOUNTER — APPOINTMENT (OUTPATIENT)
Dept: LAB | Age: 69
End: 2024-11-20
Attending: FAMILY MEDICINE
Payer: MEDICARE

## 2024-11-20 DIAGNOSIS — C85.10 LOW GRADE B-CELL LYMPHOMA (CMS/HCC): ICD-10-CM

## 2024-11-20 DIAGNOSIS — Z13.220 SCREENING CHOLESTEROL LEVEL: ICD-10-CM

## 2024-11-20 DIAGNOSIS — Z00.00 MEDICARE ANNUAL WELLNESS VISIT, SUBSEQUENT: ICD-10-CM

## 2024-11-20 LAB
ALBUMIN SERPL-MCNC: 4.6 G/DL (ref 3.5–5.7)
ALP SERPL-CCNC: 67 IU/L (ref 34–125)
ALT SERPL-CCNC: 34 IU/L (ref 7–52)
ANION GAP SERPL CALC-SCNC: 6 MEQ/L (ref 3–15)
AST SERPL-CCNC: 34 IU/L (ref 13–39)
BASOPHILS # BLD: 0.04 K/UL (ref 0.01–0.1)
BASOPHILS NFR BLD: 1.4 %
BILIRUB SERPL-MCNC: 0.7 MG/DL (ref 0.3–1.2)
BUN SERPL-MCNC: 17 MG/DL (ref 7–25)
CALCIUM SERPL-MCNC: 9.4 MG/DL (ref 8.6–10.3)
CHLORIDE SERPL-SCNC: 103 MEQ/L (ref 98–107)
CHOLEST SERPL-MCNC: 193 MG/DL
CO2 SERPL-SCNC: 29 MEQ/L (ref 21–31)
CREAT SERPL-MCNC: 0.8 MG/DL (ref 0.6–1.2)
DIFFERENTIAL METHOD BLD: ABNORMAL
EGFRCR SERPLBLD CKD-EPI 2021: >60 ML/MIN/1.73M*2
EOSINOPHIL # BLD: 0.05 K/UL (ref 0.04–0.36)
EOSINOPHIL NFR BLD: 1.8 %
ERYTHROCYTE [DISTWIDTH] IN BLOOD BY AUTOMATED COUNT: 13.1 % (ref 11.7–14.4)
GLUCOSE SERPL-MCNC: 88 MG/DL (ref 70–99)
HCT VFR BLD AUTO: 44.1 % (ref 35–45)
HDLC SERPL-MCNC: 89 MG/DL
HDLC SERPL: 2.2 {RATIO}
HGB BLD-MCNC: 14.4 G/DL (ref 11.8–15.7)
IMM GRANULOCYTES # BLD AUTO: 0 K/UL (ref 0–0.08)
IMM GRANULOCYTES NFR BLD AUTO: 0 %
LDLC SERPL CALC-MCNC: 94 MG/DL
LYMPHOCYTES # BLD: 0.69 K/UL (ref 1.2–3.5)
LYMPHOCYTES NFR BLD: 24.4 %
MCH RBC QN AUTO: 30.4 PG (ref 28–33.2)
MCHC RBC AUTO-ENTMCNC: 32.7 G/DL (ref 32.2–35.5)
MCV RBC AUTO: 93 FL (ref 83–98)
MONOCYTES # BLD: 0.27 K/UL (ref 0.28–0.8)
MONOCYTES NFR BLD: 9.5 %
NEUTROPHILS # BLD: 1.78 K/UL (ref 1.7–7)
NEUTS SEG NFR BLD: 62.9 %
NONHDLC SERPL-MCNC: 104 MG/DL
NRBC BLD-RTO: 0 %
PLAT MORPH BLD: NORMAL
PLATELET # BLD AUTO: 194 K/UL (ref 150–369)
PLATELET # BLD EST: ABNORMAL 10*3/UL
PMV BLD AUTO: 10.6 FL (ref 9.4–12.3)
POTASSIUM SERPL-SCNC: 4.6 MEQ/L (ref 3.5–5.1)
PROT SERPL-MCNC: 7.1 G/DL (ref 6–8.2)
RBC # BLD AUTO: 4.74 M/UL (ref 3.93–5.22)
RBC MORPH BLD: NORMAL
SODIUM SERPL-SCNC: 138 MEQ/L (ref 136–145)
TRIGL SERPL-MCNC: 49 MG/DL
WBC # BLD AUTO: 2.83 K/UL (ref 3.8–10.5)

## 2024-11-20 PROCEDURE — 80053 COMPREHEN METABOLIC PANEL: CPT

## 2024-11-20 PROCEDURE — 85025 COMPLETE CBC W/AUTO DIFF WBC: CPT

## 2024-11-20 PROCEDURE — 80061 LIPID PANEL: CPT | Mod: GA

## 2024-11-20 PROCEDURE — 36415 COLL VENOUS BLD VENIPUNCTURE: CPT

## 2024-11-20 PROCEDURE — 82040 ASSAY OF SERUM ALBUMIN: CPT

## 2024-11-22 ENCOUNTER — DOCUMENTATION (OUTPATIENT)
Dept: FAMILY MEDICINE | Facility: CLINIC | Age: 69
End: 2024-11-22
Payer: MEDICARE

## 2024-11-22 VITALS — DIASTOLIC BLOOD PRESSURE: 76 MMHG | SYSTOLIC BLOOD PRESSURE: 125 MMHG

## 2025-05-23 ENCOUNTER — TRANSCRIBE ORDERS (OUTPATIENT)
Dept: SCHEDULING | Age: 70
End: 2025-05-23

## 2025-05-23 DIAGNOSIS — C85.11 UNSPECIFIED B-CELL LYMPHOMA, LYMPH NODES OF HEAD, FACE, AND NECK (CMS/HCC): Primary | ICD-10-CM

## 2025-06-12 ENCOUNTER — OFFICE VISIT (OUTPATIENT)
Dept: FAMILY MEDICINE | Facility: CLINIC | Age: 70
End: 2025-06-12
Payer: MEDICARE

## 2025-06-12 VITALS
DIASTOLIC BLOOD PRESSURE: 82 MMHG | WEIGHT: 115 LBS | RESPIRATION RATE: 18 BRPM | OXYGEN SATURATION: 100 % | SYSTOLIC BLOOD PRESSURE: 130 MMHG | HEIGHT: 63 IN | BODY MASS INDEX: 20.38 KG/M2 | HEART RATE: 88 BPM | TEMPERATURE: 97.8 F

## 2025-06-12 DIAGNOSIS — C85.10 LOW GRADE B-CELL LYMPHOMA (CMS/HCC): ICD-10-CM

## 2025-06-12 DIAGNOSIS — R21 RASH: Primary | ICD-10-CM

## 2025-06-12 PROCEDURE — 99213 OFFICE O/P EST LOW 20 MIN: CPT | Performed by: NURSE PRACTITIONER

## 2025-06-12 RX ORDER — CLOTRIMAZOLE AND BETAMETHASONE DIPROPIONATE 10; .64 MG/G; MG/G
CREAM TOPICAL 2 TIMES DAILY
Qty: 45 G | Refills: 0 | Status: SHIPPED | OUTPATIENT
Start: 2025-06-12 | End: 2025-07-12

## 2025-06-12 ASSESSMENT — ENCOUNTER SYMPTOMS
CHILLS: 0
PALPITATIONS: 0
VOMITING: 0
NAUSEA: 0
FEVER: 0
COUGH: 0
WHEEZING: 0
MYALGIAS: 0
ARTHRALGIAS: 0
SHORTNESS OF BREATH: 0
FATIGUE: 0
DIARRHEA: 0

## 2025-06-12 NOTE — ASSESSMENT & PLAN NOTE
Does not look like classic erythema migrans to me, but I do understand her concern  Photo uploaded to media  She has no worrisome symptoms  Offered lyme testing- she declines  She is comfortable monitoring for now  Sent rx for lotrisone to try  If develops fever/chills/joint pain/headaches/unusual fatigue/worsening of rash she is to notify me immediately  She already has derm appt in place in 2 weeks for if rash does not resolve  F/u as needed

## 2025-06-23 ENCOUNTER — APPOINTMENT (OUTPATIENT)
Dept: URBAN - METROPOLITAN AREA CLINIC 374 | Facility: CLINIC | Age: 70
Setting detail: DERMATOLOGY
End: 2025-06-23

## 2025-06-23 DIAGNOSIS — D22 MELANOCYTIC NEVI: ICD-10-CM | Status: UNCHANGED

## 2025-06-23 DIAGNOSIS — L91.8 OTHER HYPERTROPHIC DISORDERS OF THE SKIN: ICD-10-CM | Status: UNCHANGED

## 2025-06-23 DIAGNOSIS — L81.5 LEUKODERMA, NOT ELSEWHERE CLASSIFIED: ICD-10-CM | Status: UNCHANGED

## 2025-06-23 DIAGNOSIS — D18.0 HEMANGIOMA: ICD-10-CM | Status: UNCHANGED

## 2025-06-23 DIAGNOSIS — L81.4 OTHER MELANIN HYPERPIGMENTATION: ICD-10-CM | Status: UNCHANGED

## 2025-06-23 DIAGNOSIS — L82.1 OTHER SEBORRHEIC KERATOSIS: ICD-10-CM | Status: UNCHANGED

## 2025-06-23 DIAGNOSIS — Z80.8 FAMILY HISTORY OF MALIGNANT NEOPLASM OF OTHER ORGANS OR SYSTEMS: ICD-10-CM

## 2025-06-23 DIAGNOSIS — Z71.89 OTHER SPECIFIED COUNSELING: ICD-10-CM

## 2025-06-23 PROBLEM — D22.5 MELANOCYTIC NEVI OF TRUNK: Status: ACTIVE | Noted: 2025-06-23

## 2025-06-23 PROBLEM — D18.01 HEMANGIOMA OF SKIN AND SUBCUTANEOUS TISSUE: Status: ACTIVE | Noted: 2025-06-23

## 2025-06-23 PROCEDURE — ? COUNSELING

## 2025-06-23 PROCEDURE — ? FULL BODY SKIN EXAM

## 2025-06-23 PROCEDURE — ? DIAGNOSIS COMMENT

## 2025-06-23 ASSESSMENT — LOCATION SIMPLE DESCRIPTION DERM
LOCATION SIMPLE: RIGHT FOREARM
LOCATION SIMPLE: UPPER BACK
LOCATION SIMPLE: RIGHT HAND
LOCATION SIMPLE: RIGHT AXILLARY VAULT
LOCATION SIMPLE: RIGHT UPPER BACK
LOCATION SIMPLE: RIGHT ANTERIOR NECK
LOCATION SIMPLE: RIGHT PRETIBIAL REGION
LOCATION SIMPLE: ABDOMEN
LOCATION SIMPLE: LEFT HAND
LOCATION SIMPLE: LEFT AXILLARY VAULT
LOCATION SIMPLE: LEFT ANTERIOR NECK
LOCATION SIMPLE: LEFT FOREARM
LOCATION SIMPLE: LEFT PRETIBIAL REGION
LOCATION SIMPLE: LEFT CHEEK

## 2025-06-23 ASSESSMENT — LOCATION ZONE DERM
LOCATION ZONE: AXILLAE
LOCATION ZONE: TRUNK
LOCATION ZONE: NECK
LOCATION ZONE: HAND
LOCATION ZONE: LEG
LOCATION ZONE: ARM
LOCATION ZONE: FACE

## 2025-06-23 ASSESSMENT — LOCATION DETAILED DESCRIPTION DERM
LOCATION DETAILED: RIGHT DISTAL DORSAL FOREARM
LOCATION DETAILED: LEFT INFERIOR LATERAL NECK
LOCATION DETAILED: LEFT RADIAL DORSAL HAND
LOCATION DETAILED: INFERIOR THORACIC SPINE
LOCATION DETAILED: RIGHT AXILLARY VAULT
LOCATION DETAILED: LEFT AXILLARY VAULT
LOCATION DETAILED: RIGHT MEDIAL UPPER BACK
LOCATION DETAILED: SUPERIOR THORACIC SPINE
LOCATION DETAILED: RIGHT PROXIMAL PRETIBIAL REGION
LOCATION DETAILED: RIGHT INFERIOR LATERAL NECK
LOCATION DETAILED: LEFT DISTAL DORSAL FOREARM
LOCATION DETAILED: EPIGASTRIC SKIN
LOCATION DETAILED: LEFT PROXIMAL PRETIBIAL REGION
LOCATION DETAILED: LEFT INFERIOR MEDIAL MALAR CHEEK
LOCATION DETAILED: RIGHT RADIAL DORSAL HAND

## 2025-06-23 NOTE — PROCEDURE: DIAGNOSIS COMMENT
Comment: Family history of melanoma: father. She denies personal or family history of brain or pancreatic cancer. 
Render Risk Assessment In Note?: no
Detail Level: Simple

## 2025-06-25 ENCOUNTER — HOSPITAL ENCOUNTER (OUTPATIENT)
Dept: RADIOLOGY | Facility: HOSPITAL | Age: 70
Discharge: HOME | End: 2025-06-25
Attending: NURSE PRACTITIONER
Payer: MEDICARE

## 2025-06-25 DIAGNOSIS — C85.11 UNSPECIFIED B-CELL LYMPHOMA, LYMPH NODES OF HEAD, FACE, AND NECK (CMS/HCC): ICD-10-CM

## 2025-06-25 RX ORDER — GADOBUTROL 604.72 MG/ML
5.2 INJECTION INTRAVENOUS ONCE
Status: COMPLETED | OUTPATIENT
Start: 2025-06-25 | End: 2025-06-25

## 2025-06-25 RX ADMIN — GADOBUTROL 5.2 ML: 604.72 INJECTION INTRAVENOUS at 09:25

## 2025-08-08 ENCOUNTER — APPOINTMENT (OUTPATIENT)
Dept: LAB | Age: 70
End: 2025-08-08
Attending: STUDENT IN AN ORGANIZED HEALTH CARE EDUCATION/TRAINING PROGRAM
Payer: MEDICARE

## 2025-08-08 DIAGNOSIS — C82.80: ICD-10-CM

## 2025-08-08 DIAGNOSIS — C70.9: ICD-10-CM

## 2025-08-08 LAB
ALBUMIN SERPL-MCNC: 4.4 G/DL (ref 3.5–5.7)
ALP SERPL-CCNC: 60 IU/L (ref 34–125)
ALT SERPL-CCNC: 25 IU/L (ref 7–52)
ANION GAP SERPL CALC-SCNC: 6 MEQ/L (ref 3–15)
AST SERPL-CCNC: 27 IU/L (ref 13–39)
BASOPHILS # BLD: 0.03 K/UL (ref 0.01–0.1)
BASOPHILS NFR BLD: 0.8 %
BILIRUB SERPL-MCNC: 0.5 MG/DL (ref 0.3–1.2)
BUN SERPL-MCNC: 20 MG/DL (ref 7–25)
CALCIUM SERPL-MCNC: 9.6 MG/DL (ref 8.6–10.3)
CHLORIDE SERPL-SCNC: 101 MEQ/L (ref 98–107)
CO2 SERPL-SCNC: 30 MEQ/L (ref 21–31)
CREAT SERPL-MCNC: 0.9 MG/DL (ref 0.6–1.2)
DIFFERENTIAL METHOD BLD: ABNORMAL
EGFRCR SERPLBLD CKD-EPI 2021: >60 ML/MIN/1.73M*2
EOSINOPHIL # BLD: 0.04 K/UL (ref 0.04–0.36)
EOSINOPHIL NFR BLD: 1 %
ERYTHROCYTE [DISTWIDTH] IN BLOOD BY AUTOMATED COUNT: 13.2 % (ref 11.7–14.4)
GLUCOSE SERPL-MCNC: 110 MG/DL (ref 70–99)
HCT VFR BLD AUTO: 40.4 % (ref 35–45)
HGB BLD-MCNC: 13.5 G/DL (ref 11.8–15.7)
IMM GRANULOCYTES # BLD AUTO: 0 K/UL (ref 0–0.08)
IMM GRANULOCYTES NFR BLD AUTO: 0 %
LDH SERPL L TO P-CCNC: 217 IU/L (ref 98–271)
LYMPHOCYTES # BLD: 0.89 K/UL (ref 1.2–3.5)
LYMPHOCYTES NFR BLD: 23.2 %
MCH RBC QN AUTO: 30.7 PG (ref 28–33.2)
MCHC RBC AUTO-ENTMCNC: 33.4 G/DL (ref 32.2–35.5)
MCV RBC AUTO: 91.8 FL (ref 83–98)
MONOCYTES # BLD: 0.36 K/UL (ref 0.28–0.8)
MONOCYTES NFR BLD: 9.4 %
NEUTROPHILS # BLD: 2.52 K/UL (ref 1.7–7)
NEUTS SEG NFR BLD: 65.6 %
NRBC BLD-RTO: 0 %
PLATELET # BLD AUTO: 184 K/UL (ref 150–369)
PMV BLD AUTO: 10.6 FL (ref 9.4–12.3)
POTASSIUM SERPL-SCNC: 4.5 MEQ/L (ref 3.5–5.1)
PROT SERPL-MCNC: 6.6 G/DL (ref 6–8.2)
RBC # BLD AUTO: 4.4 M/UL (ref 3.93–5.22)
SODIUM SERPL-SCNC: 137 MEQ/L (ref 136–145)
WBC # BLD AUTO: 3.84 K/UL (ref 3.8–10.5)

## 2025-08-08 PROCEDURE — 83615 LACTATE (LD) (LDH) ENZYME: CPT

## 2025-08-08 PROCEDURE — 85025 COMPLETE CBC W/AUTO DIFF WBC: CPT

## 2025-08-08 PROCEDURE — 36415 COLL VENOUS BLD VENIPUNCTURE: CPT

## 2025-08-08 PROCEDURE — 80053 COMPREHEN METABOLIC PANEL: CPT

## 2025-08-11 ENCOUNTER — TELEPHONE (OUTPATIENT)
Dept: FAMILY MEDICINE | Facility: CLINIC | Age: 70
End: 2025-08-11
Payer: MEDICARE

## 2025-08-11 DIAGNOSIS — Z12.31 SCREENING MAMMOGRAM FOR BREAST CANCER: Primary | ICD-10-CM

## 2025-08-13 ENCOUNTER — HOSPITAL ENCOUNTER (OUTPATIENT)
Dept: RADIOLOGY | Age: 70
Discharge: HOME | End: 2025-08-13
Attending: FAMILY MEDICINE
Payer: MEDICARE

## 2025-08-13 DIAGNOSIS — Z12.31 SCREENING MAMMOGRAM FOR BREAST CANCER: ICD-10-CM

## 2025-08-13 PROCEDURE — 77063 BREAST TOMOSYNTHESIS BI: CPT
